# Patient Record
Sex: FEMALE | Race: WHITE | Employment: UNEMPLOYED | URBAN - METROPOLITAN AREA
[De-identification: names, ages, dates, MRNs, and addresses within clinical notes are randomized per-mention and may not be internally consistent; named-entity substitution may affect disease eponyms.]

---

## 2018-01-09 PROBLEM — R92.8 ABNORMAL MAMMOGRAM: Status: ACTIVE | Noted: 2018-01-09

## 2019-01-23 NOTE — PROGRESS NOTES
Patient presents with:  Breast Pain: left side, upper left pain, left calf pain, started 2 years ago       HPI:     This 37year old female presents with a chief complaint of chest pain. Onset -Gallo around  .     Location - Left chest   Description wall: no tenderness  Heart: S1, S2 normal, no murmur, click, rub or gallop, regular rate and rhythm  Abdomen: soft, non-tender; bowel sounds normal; no masses,  no organomegaly  Extremities: extremities normal, atraumatic, no cyanosis or edema  Pulses: 2+

## 2019-01-25 NOTE — TELEPHONE ENCOUNTER
EKG 12-LEAD   Notes recorded by EVELIO Moscoso on 1/25/2019 at 10:41 AM CST  Normal     XR CHEST PA + LAT CHEST (CPT=71046)   Notes recorded by EVELIO Moscoso on 1/25/2019 at 10:41 AM CST  Normal     LABS  Notes recorded by TK Moscoso

## 2019-09-10 PROBLEM — N39.0 FREQUENT UTI: Status: ACTIVE | Noted: 2019-09-10

## 2021-02-15 NOTE — LETTER
ASTHMA ACTION PLAN for Ben Irene     : 1975     Date: 2023  Provider:  Rakesh Prater MD  Phone for doctor or clinic: [facility] [address]  [address] [address]  [facility]     ACT Score: 25      You can use the colors of a traffic light to help learn about your asthma medicines. 1. Green - Go! % of Personal Best Peak Flow Use controller medicine. Breathing is good  No cough or wheeze  Can work and play Medicine How much to take When to take it    PULMICORT FLEXHALER Inhale 1 puff into the lungs 2 (two) times daily. 2. Yellow - Caution. 50-79% Personal Best Peak  Flow. Use reliever medicine to keep an asthma attack from getting bad. Cough  Wheezing  Tight Chest  Wake up at night Medicine How much to take When to take it    Albuterol Inhaler                   2 puffs                             Every 4 to 6 hrs as needed for                                                                                  shortness of breath and wheezing. Additional instructions Call our office in event of an increased use of rescue inhaler to make an appointment in order to prevent red zone symptoms. 3. Red - Stop! Danger!  <50% Personal Best Peak  Flow. Take these medications until  Get help from a doctor   Medicine not helping  Breathing is hard and fast  Nose opens wide  Can't walk  Ribs show  Can't talk well Medicine How much to take When to take it    Call 911, or go to the emergency room immediately! Take Albuterol every 15 minutes until you have received medical attention. Additional Instructions If your symptoms do not improve and you cannot contact your doctor, [facility] or call 911 immediately! [x] Asthma Action Plan reviewed with patient (and caregiver if necessary) and a copy of the plan was given to the patient/caregiver.    [] Asthma Action Plan reviewed with patient (and caregiver if Pt notified of results and verbalized understanding.  Lab appt made for non-fasting A1C prior to 03/16/2021 appt with Dr. Ocampo.     necessary) on the phone and mailed copy to patient or submitted via [address]      Signatures:  Provider  Kerry Hanna MD   Patient Caretaker

## 2021-04-13 PROBLEM — F50.9 EATING DISORDER, UNSPECIFIED: Status: ACTIVE | Noted: 2021-04-13

## 2021-05-25 PROBLEM — F33.1 MAJOR DEPRESSIVE DISORDER, RECURRENT EPISODE, MODERATE WITH ANXIOUS DISTRESS (HCC): Status: ACTIVE | Noted: 2021-05-25

## 2021-10-11 PROBLEM — J45.30 MILD PERSISTENT ASTHMA, UNSPECIFIED WHETHER COMPLICATED (HCC): Status: ACTIVE | Noted: 2021-10-11

## 2021-10-11 PROBLEM — M19.041 ARTHRITIS OF FINGER OF BOTH HANDS: Status: ACTIVE | Noted: 2021-10-11

## 2021-10-11 PROBLEM — E66.811 OBESITY (BMI 30.0-34.9): Status: ACTIVE | Noted: 2021-10-11

## 2021-10-11 PROBLEM — Z13.228 SCREENING FOR METABOLIC DISORDER: Status: ACTIVE | Noted: 2021-10-11

## 2021-10-11 PROBLEM — R53.83 OTHER FATIGUE: Status: ACTIVE | Noted: 2021-10-11

## 2021-10-11 PROBLEM — M72.2 PLANTAR FASCIITIS: Status: ACTIVE | Noted: 2021-10-11

## 2021-10-11 PROBLEM — Z13.1 SCREENING FOR DIABETES MELLITUS: Status: ACTIVE | Noted: 2021-10-11

## 2021-10-11 PROBLEM — M19.042 ARTHRITIS OF FINGER OF BOTH HANDS: Status: ACTIVE | Noted: 2021-10-11

## 2021-10-11 PROBLEM — Z13.220 SCREENING CHOLESTEROL LEVEL: Status: ACTIVE | Noted: 2021-10-11

## 2021-10-11 PROBLEM — J45.30 MILD PERSISTENT ASTHMA, UNSPECIFIED WHETHER COMPLICATED: Status: ACTIVE | Noted: 2021-10-11

## 2021-10-11 PROBLEM — G44.209 TENSION HEADACHE: Status: ACTIVE | Noted: 2021-10-11

## 2021-10-11 PROBLEM — E66.9 OBESITY (BMI 30.0-34.9): Status: ACTIVE | Noted: 2021-10-11

## 2021-10-13 PROBLEM — M94.0 COSTOCHONDRITIS: Status: ACTIVE | Noted: 2021-10-13

## 2021-12-23 NOTE — PROGRESS NOTES
This is a telemedicine visit with live, interactive video and audio. Amy Saint Broccoli verbally consents to a Virtual/Telephone Check-In visit on 12/23/21.     Patient understands and accepts financial responsibility for any deductible, co-insurance and/or co Disease Maternal Grandfather    • Heart Disease Maternal Grandfather       Social History    Tobacco Use      Smoking status: Never Smoker      Smokeless tobacco: Never Used    Vaping Use      Vaping Use: Never used    Alcohol use: Yes      Comment: social lesions    ASSESSMENT & PLAN      1. Chest wall pain  Most likely costochondritis  Counseled on conservative tx, warm compresses rest, OTC Tylenol or motrin as needed    2. History of COVID-19  Pt asymptomatic. Doing well.       Follow up for physial in Mar

## 2022-02-10 NOTE — TELEPHONE ENCOUNTER
From: Jolanta Sumner  To: Kristi Flores MD  Sent: 2/9/2022 6:42 PM CST  Subject: Referral    Good Evening Dr. Ranulfo Deleon,  I am in need of a referral to psychiatric services. Are [facility] or do I need to schedule a visit? My current behavioral health provider is not covered by medicaid.    Thank you,  Dominick Lesch

## 2022-11-01 PROBLEM — Z13.220 SCREENING CHOLESTEROL LEVEL: Status: RESOLVED | Noted: 2021-10-11 | Resolved: 2022-11-01

## 2022-11-01 PROBLEM — G44.209 TENSION HEADACHE: Status: RESOLVED | Noted: 2021-10-11 | Resolved: 2022-11-01

## 2022-11-01 PROBLEM — Z13.1 SCREENING FOR DIABETES MELLITUS: Status: RESOLVED | Noted: 2021-10-11 | Resolved: 2022-11-01

## 2022-11-01 PROBLEM — N39.0 FREQUENT UTI: Status: RESOLVED | Noted: 2019-09-10 | Resolved: 2022-11-01

## 2022-11-01 PROBLEM — Z13.228 SCREENING FOR METABOLIC DISORDER: Status: RESOLVED | Noted: 2021-10-11 | Resolved: 2022-11-01

## 2022-11-01 NOTE — PATIENT INSTRUCTIONS
Thank you for choosing [facility]  To Do:  FOR SHAWNA AGUILAR    Continue follow up with psychiatry  Need to establish therapy and counseling  Follow up for annual physical  Arrange for mammogram  Have blood tests done before physical  Warm compresses to chest  Arrange for Abdominal ultrasound

## 2022-11-02 NOTE — TELEPHONE ENCOUNTER
[facility]  To: Erika Scott MD  Sent: 11/2/2022 3:35 PM CDT  Subject: Prior Authorization     Good Afternoon Dr. Shellie Atwood,    I scheduled the abdominal ultrasound for November 8th, however [facility] requires prior authorization through [place] beforehand. Please let me know if you have any questions. Thank you!     Helen Moran  [phone]

## 2022-11-06 PROBLEM — J45.40 MODERATE PERSISTENT ASTHMA WITHOUT COMPLICATION: Status: ACTIVE | Noted: 2022-11-06

## 2022-11-06 PROBLEM — F32.A DEPRESSION: Status: ACTIVE | Noted: 2022-11-06

## 2022-11-06 PROBLEM — J30.9 ALLERGIC RHINITIS: Status: ACTIVE | Noted: 2022-11-06

## 2022-11-06 PROBLEM — F50.819 BINGE EATING DISORDER: Status: ACTIVE | Noted: 2021-04-13

## 2022-11-06 PROBLEM — J45.40 MODERATE PERSISTENT ASTHMA WITHOUT COMPLICATION (HCC): Status: ACTIVE | Noted: 2022-11-06

## 2022-11-06 PROBLEM — F50.81 BINGE EATING DISORDER: Status: ACTIVE | Noted: 2021-04-13

## 2022-11-16 NOTE — TELEPHONE ENCOUNTER
From: Nasir Guest  To: Jud Knott MD  Sent: 11/16/2022 10:29 AM CST  Subject: Colonoscopy     Good Morning Dr. Romain Buckley,    Dr. Marie Bryan doesn't perform colonoscopies. Can you put an order in my chart and I will get it done as soon as possible.     Thank you,    Emily

## 2022-11-18 NOTE — TELEPHONE ENCOUNTER
MyChart to inform that colonoscopies are no longer performed at the [facility].

## 2022-11-18 NOTE — TELEPHONE ENCOUNTER
From: Natali William  Sent: 11/18/2022 10:02 AM CST  To: [facility] Clinical Staff  Subject: Colonoscopy     Good Morning,    Is it possible for me to schedule the colonoscopy at the following location? [facility]    If so, do you happen to know the name of the [facility] Doctor I should contact?     Thank you,  Emily

## 2022-12-18 NOTE — TELEPHONE ENCOUNTER
----- Message from rKisti Flores MD sent at 12/15/2022  1:48 PM CST -----  Pls order bilat breast US as screening for dense breasts    Pt getting previous films per radiology

## 2023-01-17 NOTE — TELEPHONE ENCOUNTER
Initiated authorization for Laparoscopic Cholecystectomy w/ Cholangiogram CPT 21041 dx:K80.11 to be done at [facility] with Lev Guthrie at [facility]  Case #[id number]  Status: Approved-authorization is not required per health plan      Scheduled 2/27/23

## 2023-05-01 NOTE — PATIENT INSTRUCTIONS
Thank you for choosing [facility]  To Do:  FOR SHAWNA AGUILAR    Follow up as needed  Safe sex practices  Arrange for colonoscopy  Follow up for annual physical when due  Have blood tests done

## 2023-07-17 NOTE — ANESTHESIA PROCEDURE NOTES
Airway  Date/Time: 7/17/2023 1:33 PM  Urgency: elective    Airway not difficult    General Information and Staff    Patient location during procedure: OR  Anesthesiologist: Saad Jackman MD  Performed: anesthesiologist   Performed by: Saad Jackman MD  Authorized by: Saad Jackman MD      Indications and Patient Condition  Indications for airway management: anesthesia  Sedation level: deep  Preoxygenated: yes  Patient position: sniffing  Mask difficulty assessment: 1 - vent by mask    Final Airway Details  Final airway type: endotracheal airway      Successful airway: ETT  Cuffed: yes   Successful intubation technique: direct laryngoscopy  Endotracheal tube insertion site: oral  Blade: Mike  Blade size: #3  ETT size (mm): 7.5    Cormack-Lehane Classification: grade IIA - partial view of glottis  Placement verified by: capnometry   Cuff volume (mL): 9  Measured from: lips  ETT to lips (cm): 21  Number of attempts at approach: 1

## 2023-07-17 NOTE — OPERATIVE REPORT
[facility]   Operative Note    Nivia Youssef Location: OR   [facility] [id number] MRN [id number]   Admission Date 7/17/2023 Operation Date 7/17/2023   Attending Physician Siva Corral MD Operating Physician Ben Puente MD     Date of procedure:   July 17, 2023    Pre-Operative Diagnosis: Symptomatic cholelithiasis, chronic cholecystitis    Indication for Surgery: Recurrent biliary colic, symptomatic cholelithiasis    Post-Operative Diagnosis/Operative Findings: Same as above negative intraoperative cholangiogram    Procedure performed:  Laparoscopic cholecystectomy with intraoperative cholangiogram    Surgeon(s) and Role:     Anibal Hill MD - Primary    Assist:     margareth Cochran    Anesthesia: General    History of Present Illness:    39-year-old female who is here for evaluation abdominal pain. The patient states that over the past 12 months she has had intermittent right upper quadrant abdominal pain with radiation to the right subscapular region. The pain is described as a dull ache with sharp exacerbations. The patient also reports fatty food intolerance as well as abdominal bloating. She has noted early satiety as well. She denies nausea or diarrhea. Over the past several months these episodes have become increasingly frequent and are lasting longer. Family history-the patient's great aunt was also diagnosed with gallstones     Ultrasound performed November 8, 2022 reveals a large calcified gallstone measuring 1.7 cm. Small gallstones are also noted. No evidence of choledocholithiasis     Treatment options were reviewed in detail with family. At this time she does wish to proceed with laparoscopic cholecystectomy with intraoperative cholangiogram for symptomatic cholelithiasis.   Emily and her father have no further questions today and wished to proceed with surgery as discussed       Discussed with patient:  The potential risks and benefits of the procedure were discussed in detail with the patient including but not limited to bleeding, infections, seroma/hematoma formation, postoperative wound complications including development of a hernia, trocar injury, intra-abdominal organ injury, bile leakage, biloma formation, common bile duct injury, conversion to an open procedure, inability to complete the cholangiogram, possible need for a drain, possible recurrence or persistence of symptoms despite surgery, postoperative diarrhea, possible need for further treatment or intervention pending cholangiogram results. These and other potential intraoperative and perioperative risks were discussed and the patient does not have any questions and does wish to proceed with surgery today. Note:  A surgical assistant was essential to the performance and conduct of this case, especially in the performance of the cholangiogram and the careful dissection needed in and around the triangle of Calot and gallbladder hilum. Summary of Procedure: The patient was taken to the operating room and was placed on the OR table in the supine position. After the induction of general anesthesia perioperative antibiotics were given. The patient was then prepped and draped in usual sterile fashion. Using local anesthesia a abdulkadir-umbilical incision was made and the anterior abdominal fascia was elevated with a Kocker forcep. The Veress needle was introduced into the abdomen and the abdomen was insufflated to 15 mm mercury. The Veress needle was then exchanged for a 11 mm port. The laparoscope was introduced. A 5 mm epigastric port and two 5 mm lateral ports were placed under direct vision without incidence. The patient was placed into a reverse Trendelenburg position with the right side up. Initial evaluation of the right upper quadrant revealed the gallbladder to be somewhat distended. Multiple omental adhesions were noted to the gallbladder and these were lysed using electrocautery.   The gallbladder was grasped at the fundus and elevated superiorly. Solano's pouch was identified and this was retracted laterally to expose the triangle of Calot. Dissection in the cystic duct-gallbladder junction was performed to define the cystic duct for sufficient length. Dissection was kept above the line of Rouviere and a critical view was obtained. The cystic duct was then clipped once proximally on the specimen side. A ductotomy was made. A cholangiocatheter was introduced through the lateral 5 mm port. The cholangiocatheter was introduced into the cystic duct. An intraoperative cholangiogram was then performed. There was no evidence of a filling defect in the cystic or the common bile duct. The common bile duct tapers down smoothly to the duodenum and there was free flow of contrast into the duodenum. The proximal common hepatic duct and distal intrahepatic radicals were visualized  without any evidence of a filling defects or other abnormalities. Representative images were submitted to the Radiology Department for a final read. The cholangiocatheter was then removed. The cystic duct was then clipped  3 times distally. The cystic duct was then divided. The cystic artery was identified and seen  to ascend onto the gallbladder wall. This was also defined for a sufficient length and was clipped twice proximally and once distally and divided. Electrocautery was then used to dissect the gallbladder away from the liver bed. Having completed this maneuver the gallbladder was placed into a Endopouch and was withdrawn through the umbilical port. The right upper quadrant was copiously irrigated until the irrigant was clear. The clips across the cystic duct and the cystic artery were examined and found to be well approximated and in good position. There was no evidence of bleeding or bile leakage from the liver bed.   The accessory ports were removed under direct vision and there was no evidence of bleeding from the anterior abdominal wall. The abdomen was then deflated. The umbilical port was closed with 0 Vicryl. All skin incisions were closed with 4-0 Vicryl in a subcuticular manner. Steri-Strips and sterile dressings were placed. All sponge, instrument and needle counts were correct at the conclusion of the procedure. The patient did tolerate the procedure well. The patient was taken to the recovery room in stable condition. EBL:   Minimal     Pathologic specimens:  The gallbladder and its contents    Lio Ball MD

## 2023-07-18 NOTE — TELEPHONE ENCOUNTER
ransaction ID: [id number] ID: [id number] Date: 2023-07-18  SHAWNA AGUILAR Patient  Member ID  [id number]    Date of Birth  1975-08-22    Gender  NA    Transaction Type  Outpatient Authorization    Organization  [address] [place] [address]    Payer  [employer] [address] [place] [address] logo  Certificate Information  Certification Number  [id number]    Status  CERTIFIED IN TOTAL    Service Information  Service Type  1 - [address] [place] [address] of [facility]  25 - On [facility]    Service From - To Date  2023-07-17 - 2023-10-17    Diagnosis Code 1   - Calculus of gallbladder w acute cholecystitis w obstruction    Procedure Code 1 (CPT/HCPCS)  [address] Kevin Manifold CHOLECYSTECTOMY/GRAPH    Quantity  1 Units    Status  CERTIFIED IN TOTAL    Rendering Providers     Provider 1  Name  Mary Carson    NPI  [id number]    Provider Role  Provider Organization    Address  [address] [place] [address]    Provider 2  Name  Issa Dougherty  [id number]    Provider Role  Attending    Address  [place] [address] [address]

## 2023-07-21 NOTE — TELEPHONE ENCOUNTER
Spoke to Emily today  Intraoperatively there were multiple sites of endometriosis throughout the abdomen  Biopsy was taken and was positive for endometriosis  Emily reports that her mother has history of endometriosis and did have to have surgery.   Her mother joined us on the call  We discussed that Emily will follow up with her PCP and or gyne to discuss management  Emily reports that she is \"feeling great\" after surgery-better than she thought she would  She is no longer taking any narcotics at this time  Emily will follow up in the office  Emily and her mother do not have any further questions at this time    Tania Warner MD, FACS

## 2023-09-20 NOTE — TELEPHONE ENCOUNTER
Patient scheduled appointment for Friday     Endometriosis, pain where gallbladder was removed, and pain on left side of pancreas

## 2023-09-20 NOTE — TELEPHONE ENCOUNTER
Called pt to obtain additional information. Pt states when she had gallbladder out in July, the surgeon informed her that she has endometriosis. Pt c/o RUQ pain and left sided rib pain. Pt does notice pain occurs after eating a fried/fatty meal and when eating a normal diet. ER precautions provided. Pt instructed to keep appt. All questions answered and pt verbalized understanding.    LOV: 05/01/23

## 2023-09-22 NOTE — PATIENT INSTRUCTIONS
Thank you for choosing [facility]  To Do:  FOR SHAWNA SANJIV AGUILAR    Warm compresses 4-5 x a day x 15-20 min  Ok to take ibuprofen as needed  Take OTC ibuprofen 600-800 mg every 6-8 hours as needed for pain. Take ibuprofen with food and stop if stomach upset nausea or vomiting.   Also ok to take Tylenol otc  Follow up with Ob , Dr Lei Montes

## 2023-10-13 NOTE — TELEPHONE ENCOUNTER
From: Oziel Campuzano  To: Rob Guo  Sent: 10/12/2023 7:43 PM CDT  Subject: Fainted Today    Delicia Cobb, I feel like I have bronchitis. I took an over the counter COVID test and the results were negative.

## 2023-11-13 ENCOUNTER — OFFICE VISIT (OUTPATIENT)
Dept: FAMILY MEDICINE CLINIC | Facility: CLINIC | Age: 48
End: 2023-11-13
Payer: MEDICAID

## 2023-11-13 VITALS
SYSTOLIC BLOOD PRESSURE: 118 MMHG | DIASTOLIC BLOOD PRESSURE: 76 MMHG | BODY MASS INDEX: 32.74 KG/M2 | HEIGHT: 68 IN | OXYGEN SATURATION: 96 % | RESPIRATION RATE: 18 BRPM | WEIGHT: 216 LBS | HEART RATE: 116 BPM

## 2023-11-13 DIAGNOSIS — Z12.31 ENCOUNTER FOR SCREENING MAMMOGRAM FOR BREAST CANCER: ICD-10-CM

## 2023-11-13 DIAGNOSIS — J30.9 ALLERGIC RHINITIS, UNSPECIFIED SEASONALITY, UNSPECIFIED TRIGGER: ICD-10-CM

## 2023-11-13 DIAGNOSIS — J45.40 MODERATE PERSISTENT ASTHMA WITHOUT COMPLICATION: ICD-10-CM

## 2023-11-13 DIAGNOSIS — Z12.11 COLON CANCER SCREENING: ICD-10-CM

## 2023-11-13 DIAGNOSIS — Z00.00 ENCOUNTER FOR ANNUAL PHYSICAL EXAMINATION EXCLUDING GYNECOLOGICAL EXAMINATION IN A PATIENT OLDER THAN 17 YEARS: Primary | ICD-10-CM

## 2023-11-13 PROCEDURE — 99396 PREV VISIT EST AGE 40-64: CPT | Performed by: EMERGENCY MEDICINE

## 2023-11-13 PROCEDURE — 3008F BODY MASS INDEX DOCD: CPT | Performed by: EMERGENCY MEDICINE

## 2023-11-13 PROCEDURE — 3078F DIAST BP <80 MM HG: CPT | Performed by: EMERGENCY MEDICINE

## 2023-11-13 PROCEDURE — 3074F SYST BP LT 130 MM HG: CPT | Performed by: EMERGENCY MEDICINE

## 2023-11-13 RX ORDER — BUDESONIDE 180 UG/1
1 AEROSOL, POWDER RESPIRATORY (INHALATION) 2 TIMES DAILY
Qty: 1 EACH | Refills: 12 | Status: SHIPPED | OUTPATIENT
Start: 2023-11-13

## 2023-11-13 RX ORDER — FLUTICASONE PROPIONATE 50 MCG
2 SPRAY, SUSPENSION (ML) NASAL DAILY
Qty: 16 G | Refills: 11 | Status: SHIPPED | OUTPATIENT
Start: 2023-11-13 | End: 2023-12-13

## 2023-11-13 RX ORDER — ALBUTEROL SULFATE 90 UG/1
1-2 AEROSOL, METERED RESPIRATORY (INHALATION) EVERY 4 HOURS PRN
Qty: 1 EACH | Refills: 3 | Status: SHIPPED | OUTPATIENT
Start: 2023-11-13

## 2023-11-14 ENCOUNTER — OFFICE VISIT (OUTPATIENT)
Facility: LOCATION | Age: 48
End: 2023-11-14
Payer: MEDICAID

## 2023-11-14 VITALS — HEART RATE: 103 BPM | TEMPERATURE: 98 F

## 2023-11-14 DIAGNOSIS — Z12.11 ENCOUNTER FOR SCREENING COLONOSCOPY: Primary | ICD-10-CM

## 2023-11-14 PROCEDURE — S0285 CNSLT BEFORE SCREEN COLONOSC: HCPCS | Performed by: STUDENT IN AN ORGANIZED HEALTH CARE EDUCATION/TRAINING PROGRAM

## 2023-11-14 RX ORDER — POLYETHYLENE GLYCOL 3350, SODIUM CHLORIDE, SODIUM BICARBONATE, POTASSIUM CHLORIDE 420; 11.2; 5.72; 1.48 G/4L; G/4L; G/4L; G/4L
POWDER, FOR SOLUTION ORAL
Qty: 1 EACH | Refills: 0 | Status: SHIPPED | OUTPATIENT
Start: 2023-11-14

## 2023-11-15 DIAGNOSIS — J45.40 MODERATE PERSISTENT ASTHMA WITHOUT COMPLICATION: ICD-10-CM

## 2023-11-15 DIAGNOSIS — J30.9 ALLERGIC RHINITIS, UNSPECIFIED SEASONALITY, UNSPECIFIED TRIGGER: ICD-10-CM

## 2023-11-15 RX ORDER — ALBUTEROL SULFATE 90 UG/1
1-2 AEROSOL, METERED RESPIRATORY (INHALATION) EVERY 4 HOURS PRN
Qty: 1 EACH | Refills: 3 | OUTPATIENT
Start: 2023-11-15

## 2023-11-15 RX ORDER — BUDESONIDE 180 UG/1
1 AEROSOL, POWDER RESPIRATORY (INHALATION) 2 TIMES DAILY
Qty: 1 EACH | Refills: 12 | OUTPATIENT
Start: 2023-11-15

## 2023-11-15 RX ORDER — FLUTICASONE PROPIONATE 50 MCG
2 SPRAY, SUSPENSION (ML) NASAL DAILY
Qty: 16 G | Refills: 11 | OUTPATIENT
Start: 2023-11-15 | End: 2023-12-15

## 2023-12-05 ENCOUNTER — PATIENT MESSAGE (OUTPATIENT)
Dept: FAMILY MEDICINE CLINIC | Facility: CLINIC | Age: 48
End: 2023-12-05

## 2023-12-06 NOTE — TELEPHONE ENCOUNTER
From: Osmar Joel  To: Jodie Flannery  Sent: 12/5/2023 2:29 PM CST  Subject: Prior Jeneane Rings ,    Marium Watson sent over Prior Authorizations to you today for 2 prescriptions:    1. Pulmicort  2. Proventil    Can you send the information to [facility] for approval? I'm still having issues getting [facility] to fill these prescriptions. Thank you!    Naomi Crowe  [phone]     Refills have been requested for the following medications:      albuterol 108 (90 Base) MCG/ACT Inhalation Aero Soln [Phamela Santino]      Budesonide (PULMICORT FLEXHALER) 180 MCG/ACT Inhalation Aerosol Powder, Breath Activated [Phamela Santino]      fluticasone propionate 50 MCG/ACT Nasal Suspension [Phamela Santino]     Preferred pharmacy: [id number] - [address] [facility], [address] AT [facility] [address] [place] [address] , [phone], [phone]

## 2023-12-08 NOTE — TELEPHONE ENCOUNTER
[address]. Albuterol is not covered; however, Proair is covered. Will change to Proair- same directions. Pulmicort is not covered; however, insurance did not provide alternatives.

## 2023-12-11 RX ORDER — FLUTICASONE PROPIONATE 110 UG/1
2 AEROSOL, METERED RESPIRATORY (INHALATION) 2 TIMES DAILY
Qty: 3 EACH | Refills: 3 | Status: SHIPPED | OUTPATIENT
Start: 2023-12-11

## 2023-12-18 ENCOUNTER — HOSPITAL ENCOUNTER (OUTPATIENT)
Dept: MAMMOGRAPHY | Age: 48
Discharge: HOME | End: 2023-12-18
Attending: EMERGENCY MEDICINE
Payer: MEDICAID

## 2023-12-18 DIAGNOSIS — Z12.31 ENCOUNTER FOR SCREENING MAMMOGRAM FOR BREAST CANCER: ICD-10-CM

## 2023-12-18 PROCEDURE — 77067 SCR MAMMO BI INCL CAD: CPT | Performed by: EMERGENCY MEDICINE

## 2023-12-18 PROCEDURE — 77063 BREAST TOMOSYNTHESIS BI: CPT | Performed by: EMERGENCY MEDICINE

## 2024-01-16 ENCOUNTER — HOSPITAL ENCOUNTER (OUTPATIENT)
Dept: MAMMOGRAPHY | Age: 49
Discharge: HOME | End: 2024-01-16
Attending: EMERGENCY MEDICINE
Payer: MEDICAID

## 2024-01-16 ENCOUNTER — HOSPITAL ENCOUNTER (OUTPATIENT)
Dept: ULTRASOUND IMAGING | Age: 49
Discharge: HOME | End: 2024-01-16
Attending: EMERGENCY MEDICINE
Payer: MEDICAID

## 2024-01-16 DIAGNOSIS — R92.2 INCONCLUSIVE MAMMOGRAM: ICD-10-CM

## 2024-01-16 PROCEDURE — 76642 ULTRASOUND BREAST LIMITED: CPT | Performed by: EMERGENCY MEDICINE

## 2024-01-16 PROCEDURE — 77065 DX MAMMO INCL CAD UNI: CPT | Performed by: EMERGENCY MEDICINE

## 2024-01-16 PROCEDURE — 77061 BREAST TOMOSYNTHESIS UNI: CPT | Performed by: EMERGENCY MEDICINE

## 2024-01-24 DIAGNOSIS — R92.30 DENSE BREAST: Primary | ICD-10-CM

## 2024-01-24 DIAGNOSIS — R92.8 ABNORMAL MAMMOGRAM: ICD-10-CM

## 2024-02-06 ENCOUNTER — TELEPHONE (OUTPATIENT)
Dept: FAMILY MEDICINE CLINIC | Facility: CLINIC | Age: 49
End: 2024-02-06

## 2024-02-09 ENCOUNTER — OFFICE VISIT (OUTPATIENT)
Dept: FAMILY MEDICINE CLINIC | Facility: CLINIC | Age: 49
End: 2024-02-09
Payer: MEDICAID

## 2024-02-09 VITALS
DIASTOLIC BLOOD PRESSURE: 80 MMHG | WEIGHT: 217 LBS | SYSTOLIC BLOOD PRESSURE: 134 MMHG | RESPIRATION RATE: 20 BRPM | HEART RATE: 102 BPM | BODY MASS INDEX: 32.89 KG/M2 | HEIGHT: 68 IN | OXYGEN SATURATION: 98 %

## 2024-02-09 DIAGNOSIS — H10.13 ALLERGIC CONJUNCTIVITIS OF BOTH EYES: Primary | ICD-10-CM

## 2024-02-09 DIAGNOSIS — N92.6 MISSED PERIOD: ICD-10-CM

## 2024-02-09 LAB
CONTROL LINE PRESENT WITH A CLEAR BACKGROUND (YES/NO): YES YES/NO
KIT EXPIRATION DATE: NORMAL DATE
KIT LOT #: NORMAL NUMERIC
PREGNANCY TEST, URINE: NEGATIVE

## 2024-02-09 RX ORDER — OLOPATADINE HYDROCHLORIDE 1 MG/ML
1 SOLUTION/ DROPS OPHTHALMIC 2 TIMES DAILY
Qty: 5 ML | Refills: 1 | Status: SHIPPED | OUTPATIENT
Start: 2024-02-09

## 2024-02-09 NOTE — PATIENT INSTRUCTIONS
Thank you for choosing [facility]  To Do:  FOR SHAWNA WILKINSON    Cool compresses to eyes  Use pataday eyedrops  Ok to try OTC Zyrtec D  Use flonase twice a day  Call if with any worsening or persistent Sx

## 2024-02-09 NOTE — PROGRESS NOTES
Chief Complaint:   Chief Complaint   Patient presents with    Eye Problem     Pt c/o bilateral itchy eyes, swelling, and constant tearing X3 days. Pt states she is using antihistamines and that seems to help. Pt also feels a lot of pressure in both eyes.     Nasal Congestion    Missed Appt     Pt states she missed her period last month. She took a few at-home pregnancy tests and they were all negative.      HPI:   This is a 48 year old female       EYE IRRITAION    4 days ago was cleaning out her grandmoms house.  Lots of  dust and mold.  Felt eye irritaion the same evning  Now with eye irration and swelling in eye lids  + excessive tearing  No light sensitivity    + mild runny nose  Benadryl helped a little  No rash   No cough  + history of asthma    Patient worried about numbness..  Did not have her menses 1 month ago.  She is usually regular and this is the first missed.  In the long time.  Did several pregnancy tests at home which were negative.          PMSH       Past Medical History:   Diagnosis Date    Allergic rhinitis     Anxiety     Arthritis     Costochondritis    ASTHMA     Asthma     Depression     Disorder of liver     fatty liver    Extrinsic asthma, unspecified     Fibrocystic breast changes     lumps removed    H/O breast biopsy 10/15/2015    HEADACHES     stress or allergy related    PONV (postoperative nausea and vomiting)     SEASONAL ALLERGIES      Past Surgical History:   Procedure Laterality Date    BENIGN BIOPSY RIGHT  2000    CHOLECYSTECTOMY      CYST ASPIRATION LEFT      AUTUMN LOCALIZATION WIRE 1 SITE RIGHT (CPT=19281)  1996    benign    OTHER SURGICAL HISTORY      Breast Lump removal. Right Breast. 2002    OTHER SURGICAL HISTORY Left     srini in ear, s/p stapendectomy    TONSILLECTOMY      1993     Social History:  Social History     Socioeconomic History    Marital status: Single   Tobacco Use    Smoking status: Never    Smokeless tobacco: Never   Vaping Use    Vaping Use: Never used    Substance and Sexual Activity    Alcohol use: Not Currently     Comment: socially    Drug use: No    Sexual activity: Yes     Partners: Male     Birth control/protection: Vasectomy   Other Topics Concern    Caffeine Concern No    Exercise No    Seat Belt No    Special Diet No    Stress Concern No    Weight Concern No   Social History Narrative    ** Merged History Encounter **          Family History:  Family History   Problem Relation Age of Onset    Hypertension Maternal Grandmother     Thyroid Disorder Maternal Grandmother     High Cholesterol Maternal Grandmother     Breast Cancer Maternal Grandmother 75        75    Cancer Maternal Grandmother         Skin    Hypertension Maternal Grandfather     High Cholesterol Maternal Grandfather     Hypertension Paternal Grandmother     High Cholesterol Paternal Grandmother     Heart Disease Paternal Grandmother     Hypertension Paternal Grandfather     High Cholesterol Paternal Grandfather     Heart Disease Paternal Grandfather     Thyroid Disorder Other     Hypertension Father     Hypertension Mother     Cancer Mother         Skin    Heart Disease Maternal Grandfather     Heart Disease Maternal Grandfather      Allergies:  Allergies   Allergen Reactions    Eggs Or Egg-Derived Products HIVES     Very bad allergic reaction with raw eggs, ie hannon dought  More cooked, less reaction      Latex ITCHING and SWELLING    Tree Nuts ANAPHYLAXIS     Including peanuts, legume family    Codeine [Opioid Analgesics] NAUSEA AND VOMITING    Latex ITCHING and SWELLING    Ascorbate ITCHING    Dust Mite Extract ITCHING     animals    Shellfish-Derived Products DIARRHEA    Sulfa Antibiotics ITCHING    Wheat Bran ITCHING     Current Meds:  Current Outpatient Medications on File Prior to Visit   Medication Sig Dispense Refill    fluticasone propionate 110 MCG/ACT Inhalation Aerosol Inhale 2 puffs into the lungs 2 (two) times daily. 3 each 3    PEG 3350-KCl-Na Bicarb-NaCl (TRILYTE) 420 g Oral  Recon Soln Starting at 4:00 pm the night before procedure, drink 8 ounces of the prep every 15-20 minutes until finished 1 each 0    albuterol 108 (90 Base) MCG/ACT Inhalation Aero Soln Inhale 1-2 puffs into the lungs every 4 (four) hours as needed for Wheezing. 1 each 3    traZODone 50 MG Oral Tab Take 1 tablet (50 mg total) by mouth nightly as needed. AT BEDTIME      escitalopram (LEXAPRO) 20 MG Oral Tab Take 1 tablet (20 mg total) by mouth daily. 90 tablet 1    DiphenhydrAMINE HCl (BENADRYL OR) Take by mouth as needed.      Cholecalciferol (VITAMIN D-3) 5000 units Oral Tab Take 1 tablet (5,000 Units total) by mouth daily as needed.      CALCIUM-MAGNESIUM OR Take 1 tablet by mouth daily as needed.      Multiple Vitamin (MULTI-VITAMIN) Oral Tab Take 1 tablet by mouth daily as needed.      EPINEPHrine (EPIPEN 2-WILLIAM IJ) Inject as directed as needed.       No current facility-administered medications on file prior to visit.      Counseling given: Not Answered         PROBLEM LIST     Patient Active Problem List   Diagnosis    H/O breast biopsy    H/O diplopia    Myopia of both eyes    Abnormal mammogram    Binge eating disorder    Major depressive disorder, recurrent episode, moderate with anxious distress (HCC)    Mild persistent asthma, unspecified whether complicated    Arthritis of finger of both hands    Plantar fasciitis    Obesity (BMI 30.0-34.9)    Costochondritis    Moderate persistent asthma without complication    Allergic rhinitis    Depression         REVIEW OF SYSTEMS:   Review of systems significant for eye irritation  The rest of the review of systems is negative except those stated as above    PHYSICAL EXAM:   /80   Pulse 102   Resp 20   Ht 5' 8\" (1.727 m)   Wt 217 lb (98.4 kg)   LMP 12/21/2023 (Exact Date)   SpO2 98%   BMI 32.99 kg/m²  Estimated body mass index is 32.99 kg/m² as calculated from the following:    Height as of this encounter: 5' 8\" (1.727 m).    Weight as of this  encounter: 217 lb (98.4 kg).   Vital signs reviewed.Appears stated age, well groomed.  GENERAL: well developed, well nourished, well hydrated, no distress  SKIN: good skin turgor, no obvious rashes  HEENT: atraumatic, normocephalic, ears, nose and throat are clear  EYES: sclera non icteric bilateral mild injection of the bulbar conjunctive a.  Corneas are clear on gross exam.  Is equal ocular movements no evidence for hyphema no evidence for foreign body on gross exam.  Scanty clear discharge bilaterally.      ASSESSMENT AND PLAN:         1. Allergic conjunctivitis of both eyes    2. Missed period  - Urine Pregnancy Test  Okay to monitor menses.  Most likely secondary to perimenopause.    PATIENT INSTRUCTIONS:    Cool compresses to eyes  Use pataday eyedrops  Ok to try OTC Zyrtec D  Use flonase twice a day  Call if with any worsening or persistent Sx

## 2024-02-21 ENCOUNTER — PATIENT MESSAGE (OUTPATIENT)
Dept: FAMILY MEDICINE CLINIC | Facility: CLINIC | Age: 49
End: 2024-02-21

## 2024-02-21 NOTE — TELEPHONE ENCOUNTER
From: Emily Sood  To: Shad De  Sent: 2/21/2024 2:31 PM CST  Subject: Blurry Vision in Right Eye    Helezio De,  I went to see an optometrist and discovered I have a problem with the macula. I have a spot in the macula that caused part of my field of vision to become blurry. When I look at a door or something with straight lines, they look curvy. Also I feel like someone took a photo of me with the flash on because I am seeing that effect in the middle of my eye.     The distorted vision is causing me to feel nauseated today. Do I need to come in for any medical testing or can you refer me to an ophthalmologist that accepts Medicaid?    The optometrist recommended I go to [facility] but the earliest appointment is Monday.    Please advise.    Thank you!   Emily  [phone]

## 2024-02-27 NOTE — TELEPHONE ENCOUNTER
Patient states to disregard below message. Patient seen at [facility]. Saw ophthalmologist on Monday, 2/26/2024. Scheduled to see retina specialist on 2/29/2024.

## 2024-02-27 NOTE — TELEPHONE ENCOUNTER
Called [facility] ([phone]). Spoke with Lisbet. [facility] is not accepting any more new patients with medicaid replacement at this time.

## 2024-02-27 NOTE — TELEPHONE ENCOUNTER
Pt may try ophthalmologists at [facility],   Suggest that patient call her  and ask for ophthalmologists that take Medicaid

## 2024-03-01 ENCOUNTER — APPOINTMENT (OUTPATIENT)
Dept: ADMINISTRATIVE | Facility: HOSPITAL | Age: 49
End: 2024-03-01
Payer: MEDICAID

## 2024-03-01 RX ORDER — FLUTICASONE PROPIONATE 50 MCG
2 SPRAY, SUSPENSION (ML) NASAL DAILY
COMMUNITY

## 2024-03-01 RX ORDER — DIFLUPREDNATE OPHTHALMIC 0.5 MG/ML
EMULSION OPHTHALMIC
COMMUNITY

## 2024-03-01 RX ORDER — CETIRIZINE HYDROCHLORIDE 10 MG/1
10 TABLET ORAL NIGHTLY
COMMUNITY

## 2024-03-14 ENCOUNTER — TELEPHONE (OUTPATIENT)
Facility: LOCATION | Age: 49
End: 2024-03-14

## 2024-03-14 NOTE — TELEPHONE ENCOUNTER
SHAWNA WILKINSON Patient  Member ID  [id number]    Date of Birth  1975-08-22    Gender  Female    Transaction Type  Outpatient Authorization    Organization  [facility]    Payer  [facility] logo     Certificate Information  Reference Number  [id number]    Status  NO ACTION REQUIRED    Message  Requested Service does not require preauthorization. We would strongly encourage you to check benefits for this service.    Member Information  Patient Name  SHAWNA WILKINSON    Patient Date of Birth  1975-08-22    Patient Gender  Female    Member ID  [id number]    Relationship to Subscriber  Self    Subscriber Name  SHAWNA WILKINSON    Requesting Provider     Name  JENSSUMITDEEJAY WILLISIGLESIA    NPI  [id number]    Tax Id  [id number]    Specialty  [id number]  Provider Role  Provider    Address  [address] [place] [address]    Phone  [phone]  Fax  [phone]    Contact Name  LINK LEACH    Service Information  Service Type  2 - Surgical    Place of Service  22 - On [facility]    Service From - To Date  2024-03-28 - 2024-06-26    Level of Service  Elective    Diagnosis Code 1   - Encounter for screening for malignant neoplasm of colon    Procedure Code 1 (CPT/HCPCS)  56269 - DIAGNOSTIC COLONOSCOPY    Quantity  1 Units    Status  NO ACTION REQUIRED    Rendering Provider/Facility     Provider 1  Name  BETH MEHTA    NPI  [id number]    Specialty  [id number]  Provider Role  Attending    Address  [address] [place] [address]    Fax  [phone]    Provider 2  Name  [facility]    NPI  [id number]    Provider Role  Facility    Address  [address] [place] [address]

## 2024-03-28 ENCOUNTER — HOSPITAL ENCOUNTER (OUTPATIENT)
Facility: HOSPITAL | Age: 49
Setting detail: HOSPITAL OUTPATIENT SURGERY
Discharge: HOME | End: 2024-03-28
Attending: STUDENT IN AN ORGANIZED HEALTH CARE EDUCATION/TRAINING PROGRAM | Admitting: STUDENT IN AN ORGANIZED HEALTH CARE EDUCATION/TRAINING PROGRAM
Payer: MEDICAID

## 2024-03-28 ENCOUNTER — ANESTHESIA EVENT (OUTPATIENT)
Dept: ENDOSCOPY | Facility: HOSPITAL | Age: 49
End: 2024-03-28
Payer: MEDICAID

## 2024-03-28 ENCOUNTER — ANESTHESIA (OUTPATIENT)
Dept: ENDOSCOPY | Facility: HOSPITAL | Age: 49
End: 2024-03-28
Payer: MEDICAID

## 2024-03-28 VITALS
RESPIRATION RATE: 16 BRPM | BODY MASS INDEX: 32.74 KG/M2 | HEART RATE: 75 BPM | SYSTOLIC BLOOD PRESSURE: 117 MMHG | TEMPERATURE: 97 F | WEIGHT: 216 LBS | OXYGEN SATURATION: 100 % | DIASTOLIC BLOOD PRESSURE: 73 MMHG | HEIGHT: 68 IN

## 2024-03-28 DIAGNOSIS — Z12.11 ENCOUNTER FOR SCREENING COLONOSCOPY: ICD-10-CM

## 2024-03-28 LAB — B-HCG UR QL: NEGATIVE

## 2024-03-28 PROCEDURE — 81025 URINE PREGNANCY TEST: CPT

## 2024-03-28 PROCEDURE — 88305 TISSUE EXAM BY PATHOLOGIST: CPT | Performed by: STUDENT IN AN ORGANIZED HEALTH CARE EDUCATION/TRAINING PROGRAM

## 2024-03-28 PROCEDURE — 0DBL8ZX EXCISION OF TRANSVERSE COLON, VIA NATURAL OR ARTIFICIAL OPENING ENDOSCOPIC, DIAGNOSTIC: ICD-10-PCS | Performed by: STUDENT IN AN ORGANIZED HEALTH CARE EDUCATION/TRAINING PROGRAM

## 2024-03-28 PROCEDURE — 0DBH8ZX EXCISION OF CECUM, VIA NATURAL OR ARTIFICIAL OPENING ENDOSCOPIC, DIAGNOSTIC: ICD-10-PCS | Performed by: STUDENT IN AN ORGANIZED HEALTH CARE EDUCATION/TRAINING PROGRAM

## 2024-03-28 RX ORDER — SODIUM CHLORIDE, SODIUM LACTATE, POTASSIUM CHLORIDE, CALCIUM CHLORIDE 600; 310; 30; 20 MG/100ML; MG/100ML; MG/100ML; MG/100ML
INJECTION, SOLUTION INTRAVENOUS CONTINUOUS
Status: DISCONTINUED | OUTPATIENT
Start: 2024-03-28 | End: 2024-03-28

## 2024-03-28 RX ORDER — ONDANSETRON 2 MG/ML
4 INJECTION INTRAMUSCULAR; INTRAVENOUS EVERY 6 HOURS PRN
Status: DISCONTINUED | OUTPATIENT
Start: 2024-03-28 | End: 2024-03-28

## 2024-03-28 RX ORDER — NALOXONE HYDROCHLORIDE 0.4 MG/ML
0.08 INJECTION, SOLUTION INTRAMUSCULAR; INTRAVENOUS; SUBCUTANEOUS AS NEEDED
Status: DISCONTINUED | OUTPATIENT
Start: 2024-03-28 | End: 2024-03-28

## 2024-03-28 RX ORDER — HYDROMORPHONE HYDROCHLORIDE 1 MG/ML
0.6 INJECTION, SOLUTION INTRAMUSCULAR; INTRAVENOUS; SUBCUTANEOUS EVERY 5 MIN PRN
Status: DISCONTINUED | OUTPATIENT
Start: 2024-03-28 | End: 2024-03-28

## 2024-03-28 RX ORDER — PROCHLORPERAZINE EDISYLATE 5 MG/ML
5 INJECTION INTRAMUSCULAR; INTRAVENOUS EVERY 8 HOURS PRN
Status: DISCONTINUED | OUTPATIENT
Start: 2024-03-28 | End: 2024-03-28

## 2024-03-28 RX ORDER — LIDOCAINE HYDROCHLORIDE 10 MG/ML
INJECTION, SOLUTION EPIDURAL; INFILTRATION; INTRACAUDAL; PERINEURAL AS NEEDED
Status: DISCONTINUED | OUTPATIENT
Start: 2024-03-28 | End: 2024-03-28 | Stop reason: SURG

## 2024-03-28 RX ORDER — HYDROMORPHONE HYDROCHLORIDE 1 MG/ML
0.2 INJECTION, SOLUTION INTRAMUSCULAR; INTRAVENOUS; SUBCUTANEOUS EVERY 5 MIN PRN
Status: DISCONTINUED | OUTPATIENT
Start: 2024-03-28 | End: 2024-03-28

## 2024-03-28 RX ORDER — HYDROMORPHONE HYDROCHLORIDE 1 MG/ML
0.4 INJECTION, SOLUTION INTRAMUSCULAR; INTRAVENOUS; SUBCUTANEOUS EVERY 5 MIN PRN
Status: DISCONTINUED | OUTPATIENT
Start: 2024-03-28 | End: 2024-03-28

## 2024-03-28 RX ADMIN — SODIUM CHLORIDE, SODIUM LACTATE, POTASSIUM CHLORIDE, CALCIUM CHLORIDE: 600; 310; 30; 20 INJECTION, SOLUTION INTRAVENOUS at 09:18:00

## 2024-03-28 RX ADMIN — SODIUM CHLORIDE, SODIUM LACTATE, POTASSIUM CHLORIDE, CALCIUM CHLORIDE: 600; 310; 30; 20 INJECTION, SOLUTION INTRAVENOUS at 09:58:00

## 2024-03-28 RX ADMIN — LIDOCAINE HYDROCHLORIDE 25 MG: 10 INJECTION, SOLUTION EPIDURAL; INFILTRATION; INTRACAUDAL; PERINEURAL at 09:21:00

## 2024-03-28 NOTE — H&P
New Patient Visit Note     Active Problems      1. Encounter for screening colonoscopy          Chief Complaint        Chief Complaint   Patient presents with    New Patient       NP - Colonoscopy Consult, Ref by Dr.Phamela De, No family hx, No symptoms.         PCP  Shad De MD      History of Present Illness   Emily Sood is a 48 year old female who presents for evaluation for colonoscopy.     The patient has not had previous colonoscopy.      The patient's family history is negative .     The patient denies nausea, vomiting, abdominal pain or cramping or bloating, , constipation, bright red blood in the stool, dark tarry stools, other recent changes in bowel habits, or recent weight loss. She does report occasional diarrhea. This has been more pronounced after her cholecystectomy in July. She reports 1-3 bowel movements a day. She reports diarrhea is worse after ingesting high fat content diet. She reports the symptoms are not bothersome to her at this time.      The patient's past medical history        Past Medical History:   Diagnosis Date    Anxiety      ASTHMA      Asthma      Depression      Disorder of liver       fatty liver    Extrinsic asthma, unspecified      Fibrocystic breast changes       lumps removed    H/O breast biopsy 10/15/2015    HEADACHES       stress or allergy related    PONV (postoperative nausea and vomiting)      SEASONAL ALLERGIES             The patient's past surgical history         Past Surgical History:   Procedure Laterality Date    BENIGN BIOPSY RIGHT   2000    CHOLECYSTECTOMY        CYST ASPIRATION LEFT        AUTUMN LOCALIZATION WIRE 1 SITE RIGHT (CPT=19281)   1996     benign    OTHER SURGICAL HISTORY         Breast Lump removal. Right Breast. 2002    OTHER SURGICAL HISTORY Left       srini in ear, s/p stapendectomy    TONSILLECTOMY         1993            The patient takes no blood thinners..        Past Medical / Surgical / Social / Family History    The past  medical history, past surgical history, family history, social history, allergies, and medications have been reviewed by me today.          Current Outpatient Medications on File Prior to Visit   Medication Sig    albuterol 108 (90 Base) MCG/ACT Inhalation Aero Soln Inhale 1-2 puffs into the lungs every 4 (four) hours as needed for Wheezing.    Budesonide (PULMICORT FLEXHALER) 180 MCG/ACT Inhalation Aerosol Powder, Breath Activated Inhale 1 puff into the lungs 2 (two) times daily.    fluticasone propionate 50 MCG/ACT Nasal Suspension 2 sprays by Nasal route daily.    traZODone 50 MG Oral Tab Take 1 tablet (50 mg total) by mouth nightly as needed. AT BEDTIME    [DISCONTINUED] albuterol 108 (90 Base) MCG/ACT Inhalation Aero Soln Inhale 1-2 puffs into the lungs every 4 (four) hours as needed for Wheezing.    [DISCONTINUED] Budesonide (PULMICORT FLEXHALER) 180 MCG/ACT Inhalation Aerosol Powder, Breath Activated Inhale 1 puff into the lungs in the morning and 1 puff before bedtime.    escitalopram (LEXAPRO) 20 MG Oral Tab Take 1 tablet (20 mg total) by mouth daily.    DiphenhydrAMINE HCl (BENADRYL OR) Take by mouth as needed.    Cholecalciferol (VITAMIN D-3) 5000 units Oral Tab Take 1 tablet (5,000 Units total) by mouth daily as needed.    CALCIUM-MAGNESIUM OR Take 1 tablet by mouth daily as needed.    Multiple Vitamin (MULTI-VITAMIN) Oral Tab Take 1 tablet by mouth daily as needed.    EPINEPHrine (EPIPEN 2-WILLIAM IJ) Inject as directed as needed.      No current facility-administered medications on file prior to visit.         Review of Systems  Constitutional: No Fever, No Chills, No Diaphoresis, No fatigue, No weight loss, No anorexia  Eyes: No burry vision, No icterus  ENT: No tinnitus, No rhinorrhea, No dysphagia, No odynophagia  Respiratory: No dyspnea, No wheezing  Cardiovascular: No chest pain, No palpitations, No leg swelling  Gastrointestinal: No abdominal pain, No abdominal distention, No bloating, No nausea, No  vomiting, No diarrhea, No constipation, No melena, No hematochezia  Endocrine: No polydipsia, No polyuria  Genitoruinary: No dysuria, No Hematuria  Musculoskeletal: No joint pain, No muscle pain  Neurologic: No dizzyness, No syncope, No headaches, No numbness  Hematologic: No easy bruising, No easy bleeding  Psychiatric: No anxiety, No depression        Physical Findings      Constitutional: Well nourished, well kempt  Eyes: EOMI, no scleral icterus  ENT: Nares moist, oropharynx clear  Neck: Supple, no tracheal deviation   Cardiac: Normal rate, no JVD  Respiratory: No respiratory distress, No audible wheezing  Abdominal: Soft, Nontender, Nondistended  Muskuloskeletal: Normal gait, Full ROM in all extremities  Lymphatic: no cervical or supraclavicular adenopathy  Skin: No rashes,  No lesions  Neurologic: No focal deficits  Psych: Appropriate mood and affect, oriented x3           Assessment   1. Encounter for screening colonoscopy             Plan   The patient is 48 year old and has not had a colonoscopy. The patient's family history is negative. The patient does have gastrointestinal symptoms mainly diarrhea. She reports this has accentuated in July after cholecystectomy.       Recommend proceeding with colonoscopy. I also discussed with her strategies to decrease diarrhea like limiting high fat content food and increasing fiber in her diet. She is going to trial that. If she continues to have diarrhea then I plan to prescribe a 4 week course of cholestyramine to aid with symptoms.     The benefits of colonoscopy, including detection of cancer and polyp removal prior to progression to cancer were discussed. The details of the procedure which include navigation of the colonoscope through the anus, rectum, and colon to evaluate the mucosa and removal of any polyps encountered were discussed as well. The risks of colonoscopy were discussed with the patient and include but are not limited to post-procedure bleeding,  infection, colorectal perforation, splenic injury, missed polyps, need for additional surgeries or interventions. All questions were answered and the patient voiced understanding and agreed to proceed with colonoscopy.        Here for  procedure today. No changes since last office visit.         Elissa Kendall MD

## 2024-03-28 NOTE — OPERATIVE REPORT
[facility]  Operative Note    Emily Sood Location: OR   CSN [id number] MRN [id number]    1975 Age 48 year old   Admission Date 3/28/2024 Operation Date 3/28/2024   Attending Physician Elissa Kendall MD Operating Physician Elissa Kendall MD   PCP Shad De MD          Patient Name: Emily Sood    Preoperative Diagnosis: Encounter for screening colonoscopy [Z12.11]    Postoperative Diagnosis: Same as pre-op diagnosis.    Primary Surgeon: Elissa Kendall MD    Anesthesia: MAC    Anesthesiologist: Anesthesiologist.: Francisco La MD    Procedures: Colonoscopy to the cecum     Specimen:     Estimated Blood Loss: 3 mL     Complications: None immediate    Condition: Good    Indications for Surgery:   Emily Sood is a 48 year old female who presents for screening colonoscopy. The benefits of colonoscopy, including detection of cancer and polyp removal prior to progression to cancer were discussed. The details of the procedure which include navigation of the colonoscope through the anus, rectum, and colon to evaluate the mucosa and removal of any polyps encountered were discussed as well. The risks of colonoscopy were discussed with the patient and include but are not limited to post-procedure bleeding, infection, colorectal perforation, splenic injury, missed polyps, need for additional surgeries or interventions. All questions were answered and the patient voiced understanding and agreed to proceed with colonoscopy.      Surgical Findings:   The quality of the bowel prep was good. Right colon 2, Transverse 3, Left colon 3    Description of Procedure:   The Patient was taken to the endoscopy suite and positioned in the left lateral decubitus position with knees flexed. Monitored anesthesia care was administered. A time-out was performed.    The perineum and perianal skin were examined. A digital rectal examination was performed. A rectocele was noted. No masses, otherwise normal exam. A  well-lubricated adult colonoscope was then inserted and carefully navigated to the cecum. Advancement was accomplished with minimal difficulty. The appendiceal orifice and ileocecal valve were identified and photographed. The terminal ileum was not intubated. The scope was then withdrawn as the mucosa was circumferentially examined. A 3 mm pedunculated polyp was noted in the cecum about 2 cms from the appendiceal orifice. The polyp was removed with cold forceps. A second pedunculated 3 mm polyp was noted in the hepatic flexure and was removed with cold forceps. The polypectomy sites were hemostatic.  In the rectum the scope was retroflexed to evaluate the anorectal junction.  The scope was straightened and excess gas was suctioned from the colon and the scope removed, terminating the procedure.    Anesthesia was terminated and the patient transported to the recovery unit in good condition. The patient tolerated the procedure well without apparent intraoperative complication.    Follow up:   Patient will need next colonoscopy 5 years with a 2 day split prep due to suboptimal prep today.       Elissa Kendall MD  3/28/2024  9:55 AM

## 2024-03-28 NOTE — DISCHARGE INSTRUCTIONS
Home Care Instructions for Colonoscopy and/or Gastroscopy with Sedation    Diet:  - Resume your regular diet as tolerated.  - Start with light meals to minimize bloating.  - Do not drink alcohol today.    Medication:  - If you have questions about resuming your normal medications, please contact your Primary Care Physician.    Activities:  - Take it easy today. Do not return to work today.  - Do not drive today.  - Do not operate any machinery today (including kitchen equipment).    Colonoscopy:  - You may notice some rectal \"spotting\" (a little blood on the toilet tissue) for a day or two after the exam. This is normal.  - If you experience any rectal bleeding (not spotting), persistent tenderness or sharp severe abdominal pains, oral temperature over 100 degrees Fahrenheit, light-headedness or dizziness, or any other problems, contact your doctor.    **If unable to reach your doctor, please go to the [facility] Emergency Room**    - Your referring physician will receive a full report of your examination.  - If you do not hear from your doctor's office within two weeks of your biopsy, please call them for your results.

## 2024-03-28 NOTE — ANESTHESIA PREPROCEDURE EVALUATION
PRE-OP EVALUATION    Patient Name: Emily Sood    Admit Diagnosis: Encounter for screening colonoscopy [Z12.11]    Pre-op Diagnosis: Encounter for screening colonoscopy [Z12.11]    COLONOSCOPY    Anesthesia Procedure: COLONOSCOPY    Surgeon(s) and Role:     * Elissa Kendall MD - Primary    Pre-op vitals reviewed.  Temp: 97.7 °F (36.5 °C)  Pulse: 80  Resp: 18  BP: 130/80  SpO2: 96 %  Body mass index is 32.84 kg/m².    Current medications reviewed.  Hospital Medications:  • lactated ringers infusion   Intravenous Continuous       Outpatient Medications:     Medications Prior to Admission   Medication Sig Dispense Refill Last Dose   • fluticasone propionate 50 MCG/ACT Nasal Suspension 2 sprays by Each Nare route daily.   3/27/2024   • cetirizine 10 MG Oral Tab Take 1 tablet (10 mg total) by mouth at bedtime.   3/27/2024   • difluprednate 0.05 % Ophthalmic Emulsion Apply to eye. Not started   3/27/2024   • olopatadine 0.1 % Ophthalmic Solution Place 1 drop into both eyes 2 (two) times daily. 5 mL 1    • fluticasone propionate 110 MCG/ACT Inhalation Aerosol Inhale 2 puffs into the lungs 2 (two) times daily. 3 each 3 3/27/2024   • PEG 3350-KCl-Na Bicarb-NaCl (TRILYTE) 420 g Oral Recon Soln Starting at 4:00 pm the night before procedure, drink 8 ounces of the prep every 15-20 minutes until finished 1 each 0 3/27/2024   • albuterol 108 (90 Base) MCG/ACT Inhalation Aero Soln Inhale 1-2 puffs into the lungs every 4 (four) hours as needed for Wheezing. 1 each 3 Past Week   • traZODone 50 MG Oral Tab Take 1 tablet (50 mg total) by mouth nightly as needed. AT BEDTIME   Past Week   • escitalopram (LEXAPRO) 20 MG Oral Tab Take 1 tablet (20 mg total) by mouth daily. 90 tablet 1 3/27/2024   • Cholecalciferol (VITAMIN D-3) 5000 units Oral Tab Take 1 tablet (5,000 Units total) by mouth daily.   Past Month   • CALCIUM-MAGNESIUM OR Take 1 tablet by mouth daily.   Past Month   • Multiple Vitamin (MULTI-VITAMIN) Oral Tab Take 1 tablet  by mouth daily.   Past Month   • EPINEPHrine (EPIPEN 2-WILLIAM IJ) Inject as directed as needed.   Unknown       Allergies: Eggs or egg-derived products, Latex, Tree nuts, Codeine [opioid analgesics], Ascorbate, Dust mite extract, Shellfish-derived products, Sulfa antibiotics, and Wheat bran      Anesthesia Evaluation    Patient summary reviewed.    Anesthetic Complications  (+) history of anesthetic complications         GI/Hepatic/Renal                (+) liver disease                 Cardiovascular                                                       Endo/Other                                  Pulmonary      (+) asthma                     Neuro/Psych      (+) depression                              Past Surgical History:   Procedure Laterality Date   • BENIGN BIOPSY RIGHT  2000   • CHOLECYSTECTOMY     • CYST ASPIRATION LEFT     • AUTUMN LOCALIZATION WIRE 1 SITE RIGHT (CPT=19281)  1996    benign   • OTHER SURGICAL HISTORY      Breast Lump removal. Right Breast. 2002   • OTHER SURGICAL HISTORY Left     srini in ear, s/p stapendectomy   • TONSILLECTOMY      1993     Social History     Socioeconomic History   • Marital status: Single   Tobacco Use   • Smoking status: Never   • Smokeless tobacco: Never   Vaping Use   • Vaping Use: Never used   Substance and Sexual Activity   • Alcohol use: Not Currently     Comment: socially   • Drug use: No   • Sexual activity: Yes     Partners: Male     Birth control/protection: Vasectomy   Other Topics Concern   • Caffeine Concern No   • Exercise No   • Seat Belt No   • Special Diet No   • Stress Concern No   • Weight Concern No     History   Drug Use No     Available pre-op labs reviewed.               Airway      Mallampati: II  Mouth opening: 3 FB  TM distance: > 6 cm  Neck ROM: full Cardiovascular    Cardiovascular exam normal.  Rhythm: regular  Rate: normal     Dental             Pulmonary    Pulmonary exam normal.                 Other findings          ASA: 2   Plan: MAC  NPO status  verified and patient meets guidelines.          Plan/risks discussed with: patient and significant other            Present on Admission:  **None**

## 2024-03-28 NOTE — ANESTHESIA POSTPROCEDURE EVALUATION
[facility]    Emily Sood Patient Status:  Hospital Outpatient Surgery   Age/Gender 48 year old female MRN [id number]   Location [facility] Attending Elissa Kendall MD   Hosp Day # 0 PCP Shad De MD       Anesthesia Post-op Note    COLONOSCOPY with forcep polypectomy    Procedure Summary       Date: 03/28/24 Room / Location:  ENDOSCOPY 04 / [facility]    Anesthesia Start: 0921 Anesthesia Stop: 0958    Procedure: COLONOSCOPY with forcep polypectomy Diagnosis:       Encounter for screening colonoscopy      (colon polyps)    Surgeons: Elissa Kendall MD Anesthesiologist: Francisco La MD    Anesthesia Type: MAC ASA Status: 2            Anesthesia Type: MAC    Vitals Value Taken Time   /70 03/28/24 0958   Temp 97.2 °F (36.2 °C) 03/28/24 0958   Pulse 93 03/28/24 0958   Resp 16 03/28/24 0958   SpO2 100 % 03/28/24 0958       Patient Location: Endoscopy    Anesthesia Type: MAC    Airway Patency: patent    Postop Pain Control: adequate    Mental Status: mildly sedated but able to meaningfully participate in the post-anesthesia evaluation    Nausea/Vomiting: none    Cardiopulmonary/Hydration status: stable euvolemic    Complications: no apparent anesthesia related complications    Postop vital signs: stable    Dental Exam: Unchanged from Preop    Patient to be discharged home when criteria met.

## 2024-04-03 ENCOUNTER — TELEPHONE (OUTPATIENT)
Facility: LOCATION | Age: 49
End: 2024-04-03

## 2024-04-03 NOTE — TELEPHONE ENCOUNTER
----- Message from Elissa Kendall MD sent at 4/2/2024 10:03 PM CDT -----  Ok to call patient with result. pt will need a colonoscopy in 7  years. Please place in the recall system.   ---------------  7-year colonoscopy recall placed, and Care Gaps updated

## 2024-11-14 ENCOUNTER — OFFICE VISIT (OUTPATIENT)
Dept: FAMILY MEDICINE CLINIC | Facility: CLINIC | Age: 49
End: 2024-11-14
Payer: MEDICAID

## 2024-11-14 VITALS
SYSTOLIC BLOOD PRESSURE: 118 MMHG | RESPIRATION RATE: 24 BRPM | DIASTOLIC BLOOD PRESSURE: 64 MMHG | HEART RATE: 82 BPM | WEIGHT: 207 LBS | HEIGHT: 68 IN | BODY MASS INDEX: 31.37 KG/M2 | OXYGEN SATURATION: 98 %

## 2024-11-14 DIAGNOSIS — J45.40 MODERATE PERSISTENT ASTHMA WITHOUT COMPLICATION (HCC): ICD-10-CM

## 2024-11-14 DIAGNOSIS — Z00.00 ENCOUNTER FOR ANNUAL PHYSICAL EXAM: Primary | ICD-10-CM

## 2024-11-14 DIAGNOSIS — J30.9 ALLERGIC RHINITIS, UNSPECIFIED SEASONALITY, UNSPECIFIED TRIGGER: ICD-10-CM

## 2024-11-14 DIAGNOSIS — J45.30 MILD PERSISTENT ASTHMA, UNSPECIFIED WHETHER COMPLICATED (HCC): ICD-10-CM

## 2024-11-14 DIAGNOSIS — Z12.31 SCREENING MAMMOGRAM FOR BREAST CANCER: ICD-10-CM

## 2024-11-14 PROCEDURE — 99396 PREV VISIT EST AGE 40-64: CPT | Performed by: EMERGENCY MEDICINE

## 2024-11-14 RX ORDER — FARICIMAB 6 MG/.05ML
0.05 INJECTION, SOLUTION INTRAVITREAL
COMMUNITY
Start: 2024-03-05

## 2024-11-14 RX ORDER — FLUTICASONE PROPIONATE 50 MCG
2 SPRAY, SUSPENSION (ML) NASAL DAILY
Qty: 1 EACH | Refills: 11 | Status: SHIPPED | OUTPATIENT
Start: 2024-11-14

## 2024-11-14 RX ORDER — ALBUTEROL SULFATE 90 UG/1
1-2 INHALANT RESPIRATORY (INHALATION) EVERY 4 HOURS PRN
Qty: 1 EACH | Refills: 3 | Status: SHIPPED | OUTPATIENT
Start: 2024-11-14

## 2024-11-14 RX ORDER — HYDROXYZINE HYDROCHLORIDE 10 MG/1
10 TABLET, FILM COATED ORAL DAILY
COMMUNITY
Start: 2024-10-04

## 2024-11-14 RX ORDER — FLUTICASONE PROPIONATE 110 UG/1
2 AEROSOL, METERED RESPIRATORY (INHALATION) 2 TIMES DAILY
Qty: 3 EACH | Refills: 3 | Status: SHIPPED | OUTPATIENT
Start: 2024-11-14

## 2024-11-14 NOTE — PATIENT INSTRUCTIONS
Thank you for choosing [facility]  To Do:  FOR SHAWNA WILKINSON    Have blood tests done  Arrange for mammogram  COntinue with flovent and albuterol as needed  Arrange for repeat mammogram left breast  Screening mammogram due in Dec  Follow up yearly or as needed  Recommend flu shingles pneumonia and tetanus shot.            Prevention Guidelines, Women Ages 50 to 64  Screening tests and vaccines are an important part of managing your health. Health counseling is essential, too. Below are guidelines for these, for women ages 50 to 64. Talk with your healthcare provider to make sure you’re up to date on what you need.  Screening Who needs it How often   Type 2 diabetes or prediabetes All adults beginning at age 45 and adults without symptoms at any age who are overweight or obese and have 1 or more additional risk factors for diabetes. At  least every 3 years   Alcohol misuse All women in this age group At routine exams   Blood pressure All women in this age group Every 2 years if your blood pressure is less than 120/80 mm Hg; yearly if your systolic blood pressure is 120 to 139 mm Hg, or your diastolic blood pressure reading is 80 to 89 mm Hg   Breast cancer All women in this age group Yearly mammogram and clinical breast exam1   Cervical cancer All women in this age group, except women who have had a complete hysterectomy Pap test every 3 years or Pap test with human papillomavirus (HPV) test every 5 years   Chlamydia Women at increased risk for infection At routine exams   Colorectal cancer All women in this age group Flexible sigmoidoscopy every 5 years, or colonoscopy every 10 years, or double-contrast barium enema every 5 years; yearly fecal occult blood test or fecal immunochemical test; or a stool DNA test as often as your health care provider advises; talk with your health care provider about which tests are best for you   Depression All women in this age group At routine exams   Gonorrhea Sexually  active women at increased risk for infection At routine exams   Hepatitis C Anyone at increased risk; 1 time for those born between 1945 and 1965 At routine exams   High cholesterol or triglycerides All women in this age group who are at risk for coronary artery disease At least every 5 years   HIV All women At routine exams   Lung cancer Adults age 55 to 80 who have smoked Yearly screening in smokers with 30 pack-year history of smoking or who quit within 15 years   Obesity All women in this age group At routine exams   Osteoporosis Women who are postmenopausal Ask your healthcare provider   Syphilis Women at increased risk for infection - talk with your healthcare provider At routine exams   Tuberculosis Women at increased risk for infection - talk with your healthcare provider Ask your healthcare provider   Vision All women in this age group Ask your healthcare provider   Vaccine Who needs it How often   Chickenpox (varicella) All women in this age group who have no record of this infection or vaccine 2 doses; the second dose should be given at least 4 weeks after the first dose   Hepatitis A Women at increased risk for infection - talk with your healthcare provider 2 doses given at least 6 months apart   Hepatitis B Women at increased risk for infection - talk with your healthcare provider 3 doses over 6 months; second dose should be given 1 month after the first dose; the third dose should be given at least 2 months after the second dose and at least 4 months after the first dose   Haemophilus influenzaeType B (HIB) Women at increased risk for infection - talk with your healthcare provider 1 to 3 doses   Influenza (flu) All women in this age group Once a year   Measles, mumps, rubella (MMR) Women in this age group through their late 50s who have no record of these infections or vaccines 1 dose   Meningococcal Women at increased risk for infection - talk with your healthcare provider 1 or more doses    Pneumococcal conjugate vaccine (PCV13) and pneumococcal polysaccharide vaccine (PPSV23) Women at increased risk for infection - talk with your healthcare provider PCV13: 1 dose ages 19 to 65 (protects against 13 types of pneumococcal bacteria)  PPSV23: 1 to 2 doses through age 64, or 1 dose at 65 or older (protects against 23 types of pneumococcal bacteria)   Tetanus/diphtheria/pertussis (Td/Tdap) booster All women in this age group Td every 10 years, or a one-time dose of Tdap instead of a Td booster after age 18, then Td every 10 years   Zoster All women ages 60 and older 1 dose   Counseling Who needs it How often   BRCA gene mutation testing for breast and ovarian cancer susceptibility Women with increased risk for having gene mutation When your risk is known   Breast cancer and chemoprevention Women at high risk for breast cancer When your risk is known   Diet and exercise Women who are overweight or obese When diagnosed, and then at routine exams   Sexually transmitted infection prevention Women at increased risk for infection - talk with your healthcare provider At routine exams   Use of daily aspirin Women ages 55 and up in this age group who are at risk for cardiovascular health problems such as stroke When your risk is known   Use of tobacco and the health effects it can cause All women in this age group Every exam   1[employer]  Date Last Reviewed: 1/26/2016  © 1073-3834 [employer]. [address] [place] [address], [place] [address]. All rights reserved. This information is not intended as a substitute for professional medical care. Always follow your healthcare professional's instructions.        Preventing Osteoporosis: Meeting Your Calcium Needs    Your body needs calcium to build and repair bones. But it can't make calcium on its own. That's why it's important to eat calcium-rich foods. Some foods are naturally rich in calcium. Others have calcium added (fortified). It's best to  get calcium from the foods you eat. But if you can't get enough, you may want to take calcium supplements. To meet your daily calcium needs, try the foods listed below.  Dairy Fish & beans Other sources      Source   Calcium (mg) per serving   Source   Calcium (mg) per serving   Source   Calcium (mg) per serving      Low-fat yogurt, plain   415 mg/8 oz.   Sardines, [place], canned, with bones   351 mg/3 oz.   Oatmeal, instant, fortified   215 mg/1 cup   Nonfat milk   302 mg/1 cup   [place], sockeye, canned, with bones   239 mg/3 oz.   Tofu made with calcium sulfate   204 mg/3 oz.   Low-fat milk   297 mg/1 cup   Soybeans, fresh, boiled   131 mg/1/2 cup   Collards   179 mg/1/2 cup   Swiss cheese   272 mg/1 oz.   White beans, cooked   81 mg/1/2 cup   English muffin, whole wheat   175 mg/1 muffin   Cheddar cheese   205 mg/1 oz.   Navy beans, cooked   79 mg/1/2 cup   Kale   90 mg/1/2 cup   Ice cream strawberry   79 mg/1/2 cup           Orange, navel   56 mg/1 medium   Note: Calcium levels may vary depending on brand and size.  Daily calcium needs  14-18 years old: 1,300 mg  19-30 years old: 1,000 mg  31-50 years old: 1,000 mg  51-70 years old, women: 1,200 mg  51-70 years old, men: 1,000 mg  Pregnant or nursin-28 years old: 1,300 mg, 19-50 years old: 1,000 mg  Older than 70 (women and men): 1,200 mg   Date Last Reviewed: 10/17/2015  © 3225-1570 [employer]. [address] [place] [address], [place] [address]. All rights reserved. This information is not intended as a substitute for professional medical care. Always follow your healthcare professional's instructions.

## 2024-11-14 NOTE — PROGRESS NOTES
Emily Sood is a 49 year old female who presents for a complete physical exam.   HPI:     Chief Complaint   Patient presents with    Well Adult          Age: 49    1First day of last menstrual period (or first year of         menstruation, if through menopause Oct 20   2Number of times pregnant: 0              Number of completed pregnancies:  0              Date of last pregnancy:      3. If you are under age 55, what method of birth control do you use? NO              Are you planning a pregnancy  in the next 6-12 months? NO               If you are through menopause or over age 50, do you take  any of the following pills?                          Calcium YES                        Estrogen (Premarin)              NO                        Progesterone (Provera) NO   4. Have you had any of the following problems:               A.  Abnormal Pap smears  All NL               If yes, date:                 problem:                 For abnormality, did you have any of the following done:                    Colposcopy  NO                  Biopsies    NO                  Surgery     NO            B. High blood pressure, heart disease or high cholesterol NO                  D.Abdominal or pelvic surgery or special tests Cholecystectomy   5. Do you have any of the following:      Problems with present method of birth control NA   Bleeding between periods or since periods stopped NO    A new or enlarging lump in breast NO      Change in size/firmness of stools  YES   NO   Change in size/color of a mole NO   6. Do you have a parent, brother or sister with a history of the following:                  Cancer of the breast, intestine or female organs YES Grandmother Dx in her 70's                Heart pain or heart attacks  before the age of 55 YES brother with MI, 46 y/o   8. Have you ever used tobacco?      NO   9. Do you drink alcohol?              Rare/minimal   10. Prevention:         b. Exercise:                               Activity:  tries       c. Do you always wear seat belts?          YES       d. If over 30 years old, have you  had your cholesterol level checked  in the past five years? YES       e. Have you had a tetanus shot  the past 10 years? No Declines today          f. Does your house have a working smoke detector? YES        g. Do you have firearms at home?           NO        h. Have you ever had a mammogram?  YES       i.  How many sexual partners have you  had in the last 12 months? 1            In your lifetime?       j. When is the last time you had           a dental check-up?  Few months ago         11. Please describe any concerns you have:      ASTHMA  On Albuterol and Flovent  Rare use of albuterol  No night Sx  Non smoker  Sx usually triggered by change in season           Wt Readings from Last 3 Encounters:   11/14/24 207 lb (93.9 kg)   03/28/24 216 lb (98 kg)   02/09/24 217 lb (98.4 kg)        BP Readings from Last 3 Encounters:   11/14/24 118/64   03/28/24 117/73   02/09/24 134/80         Patient's last menstrual period was 10/20/2024 (approximate).       Current Outpatient Medications   Medication Sig Dispense Refill    Faricimab-svoa (VABYSMO) 6 MG/0.05ML Intravitreal Solution 0.05 mL by Intravitreal route.      hydrOXYzine 10 MG Oral Tab Take 1 tablet (10 mg total) by mouth daily.      fluticasone propionate 50 MCG/ACT Nasal Suspension 2 sprays by Each Nare route daily. 1 each 11    fluticasone propionate 110 MCG/ACT Inhalation Aerosol Inhale 2 puffs into the lungs 2 (two) times daily. 3 each 3    albuterol 108 (90 Base) MCG/ACT Inhalation Aero Soln Inhale 1-2 puffs into the lungs every 4 (four) hours as needed for Wheezing. 1 each 3    cetirizine 10 MG Oral Tab Take 1 tablet (10 mg total) by mouth at bedtime.      escitalopram (LEXAPRO) 20 MG Oral Tab Take 1 tablet (20 mg total) by mouth daily. 90 tablet 1    Cholecalciferol (VITAMIN D-3) 5000 units Oral Tab Take 1 tablet (5,000 Units total) by mouth  daily.      CALCIUM-MAGNESIUM OR Take 1 tablet by mouth daily.      Multiple Vitamin (MULTI-VITAMIN) Oral Tab Take 1 tablet by mouth daily.      EPINEPHrine (EPIPEN 2-WILLIAM IJ) Inject as directed as needed.        Past Medical History:    Allergic rhinitis    Anxiety    Arthritis    Costochondritis    ASTHMA    Asthma (HCC)    and allergies    Depression    Disorder of liver    fatty liver    Extrinsic asthma, unspecified    Fibrocystic breast changes    lumps removed    H/O breast biopsy    HEADACHES    stress or allergy related    PONV (postoperative nausea and vomiting)    SEASONAL ALLERGIES    Visual impairment    glasses/contact lenses      Past Surgical History:   Procedure Laterality Date    Benign biopsy right  2000    Cholecystectomy      Colonoscopy N/A 3/28/2024    Procedure: COLONOSCOPY with forcep polypectomy;  Surgeon: Elissa Kendall MD;  Location:  ENDOSCOPY    Cyst aspiration left      Anisa localization wire 1 site right (cpt=19281)  1996    benign    Other surgical history      Breast Lump removal. Right Breast. 2002    Other surgical history Left     srini in ear, s/p stapendectomy    Tonsillectomy      1993      Family History   Problem Relation Age of Onset    Hypertension Maternal Grandmother     Thyroid Disorder Maternal Grandmother     High Cholesterol Maternal Grandmother     Breast Cancer Maternal Grandmother 75        75    Cancer Maternal Grandmother         Skin    Hypertension Maternal Grandfather     High Cholesterol Maternal Grandfather     Hypertension Paternal Grandmother     High Cholesterol Paternal Grandmother     Heart Disease Paternal Grandmother     Hypertension Paternal Grandfather     High Cholesterol Paternal Grandfather     Heart Disease Paternal Grandfather     Thyroid Disorder Other     Hypertension Father     Hypertension Mother     Cancer Mother         Skin    Heart Disease Maternal Grandfather     Heart Disease Maternal Grandfather       Social History      Socioeconomic History    Marital status: Single   Tobacco Use    Smoking status: Never    Smokeless tobacco: Never   Vaping Use    Vaping status: Never Used   Substance and Sexual Activity    Alcohol use: Not Currently     Comment: socially    Drug use: No    Sexual activity: Yes     Partners: Male     Birth control/protection: Vasectomy   Other Topics Concern    Caffeine Concern No    Exercise No    Seat Belt No    Special Diet No    Stress Concern No    Weight Concern No   Social History Narrative    ** Merged History Encounter **             Current Outpatient Medications   Medication Sig Dispense Refill    Faricimab-svoa (VABYSMO) 6 MG/0.05ML Intravitreal Solution 0.05 mL by Intravitreal route.      hydrOXYzine 10 MG Oral Tab Take 1 tablet (10 mg total) by mouth daily.      fluticasone propionate 50 MCG/ACT Nasal Suspension 2 sprays by Each Nare route daily. 1 each 11    fluticasone propionate 110 MCG/ACT Inhalation Aerosol Inhale 2 puffs into the lungs 2 (two) times daily. 3 each 3    albuterol 108 (90 Base) MCG/ACT Inhalation Aero Soln Inhale 1-2 puffs into the lungs every 4 (four) hours as needed for Wheezing. 1 each 3    cetirizine 10 MG Oral Tab Take 1 tablet (10 mg total) by mouth at bedtime.      escitalopram (LEXAPRO) 20 MG Oral Tab Take 1 tablet (20 mg total) by mouth daily. 90 tablet 1    Cholecalciferol (VITAMIN D-3) 5000 units Oral Tab Take 1 tablet (5,000 Units total) by mouth daily.      CALCIUM-MAGNESIUM OR Take 1 tablet by mouth daily.      Multiple Vitamin (MULTI-VITAMIN) Oral Tab Take 1 tablet by mouth daily.      EPINEPHrine (EPIPEN 2-WILLIAM IJ) Inject as directed as needed.        Past Medical History:    Allergic rhinitis    Anxiety    Arthritis    Costochondritis    ASTHMA    Asthma (HCC)    and allergies    Depression    Disorder of liver    fatty liver    Extrinsic asthma, unspecified    Fibrocystic breast changes    lumps removed    H/O breast biopsy    HEADACHES    stress or allergy  related    PONV (postoperative nausea and vomiting)    SEASONAL ALLERGIES    Visual impairment    glasses/contact lenses      Past Surgical History:   Procedure Laterality Date    Benign biopsy right  2000    Cholecystectomy      Colonoscopy N/A 3/28/2024    Procedure: COLONOSCOPY with forcep polypectomy;  Surgeon: Elissa Kendall MD;  Location: [facility] ENDOSCOPY    Cyst aspiration left      Anisa localization wire 1 site right (cpt=19281)  1996    benign    Other surgical history      Breast Lump removal. Right Breast. 2002    Other surgical history Left     srini in ear, s/p stapendectomy    Tonsillectomy      1993      Family History   Problem Relation Age of Onset    Hypertension Maternal Grandmother     Thyroid Disorder Maternal Grandmother     High Cholesterol Maternal Grandmother     Breast Cancer Maternal Grandmother 75        75    Cancer Maternal Grandmother         Skin    Hypertension Maternal Grandfather     High Cholesterol Maternal Grandfather     Hypertension Paternal Grandmother     High Cholesterol Paternal Grandmother     Heart Disease Paternal Grandmother     Hypertension Paternal Grandfather     High Cholesterol Paternal Grandfather     Heart Disease Paternal Grandfather     Thyroid Disorder Other     Hypertension Father     Hypertension Mother     Cancer Mother         Skin    Heart Disease Maternal Grandfather     Heart Disease Maternal Grandfather       Social History:   Social History     Socioeconomic History    Marital status: Single   Tobacco Use    Smoking status: Never    Smokeless tobacco: Never   Vaping Use    Vaping status: Never Used   Substance and Sexual Activity    Alcohol use: Not Currently     Comment: socially    Drug use: No    Sexual activity: Yes     Partners: Male     Birth control/protection: Vasectomy   Other Topics Concern    Caffeine Concern No    Exercise No    Seat Belt No    Special Diet No    Stress Concern No    Weight Concern No   Social History Narrative    ** Merged  History Encounter **                 REVIEW OF SYSTEMS:   GENERAL HEALTH: feels well, no fatigue.  SKIN: denies any unusual skin lesions or rashes  EYES: no visual complaints or deficits  HEENT: denies nasal congestion, sinus pain or sore throat; hearing loss negative,   RESPIRATORY: denies shortness of breath, wheezing or cough   CARDIOVASCULAR: denies chest pain, SOB, edema,orthopnea, no palpitations   GI: denies nausea, vomiting, constipation, diarrhea; no rectal bleeding; no heartburn  GENITAL/: no dysuria, urgency or frequency  MUSCULOSKELETAL: no joint complaints upper or lower extremities  NEURO: no sensory or motor complaint  HEMATOLOGY: denies hx anemia; denies bruising or excessive bleeding  ENDOCRINE: denies excessive thirst or urination; denies unexpected wt gain or wt loss  ALLERGY/IMM.: denies food or seasonal allergies  PSYCH: no symptoms of depression or anxiety, depression screening negative.        EXAM:   /64   Pulse 82   Resp 24   Ht 5' 8\" (1.727 m)   Wt 207 lb (93.9 kg)   LMP 10/20/2024 (Approximate)   SpO2 98%   BMI 31.47 kg/m²       General: WD/WN in no acute distress.   HEENT: PERRLA and EOMI.  OP moist no lesions.TM WNL, maura.Normal ears canals bilaterally.  Neck is supple, with no cervical LAD or thyroid abnormalities. No carotid bruits.    Lungs: are clear to auscultation bilaterally, with no wheeze, rhonchi, or rales.   Heart: is RRR.  S1, S2, with no murmurs,clicks, gallops  Abdomen: is soft,NBS, NT/ND with no HSM.  No rebound or guarding. No CVA tenderness, no hernias.   exam: DEFERRED  Neuro: Cranial nerves II-XII normal,no focal abnormalities, and reflexes coordination and gait normal and symmetric.Sensation intact.  Extremities: are symmetric with no cyanosis, clubbing, or edema.  MS: Normal muscles tones, no joints abnormalities.  SKIN: Normal color, turgor, no lesions, rashes or wounds.  PSYCH: normal affect and mood.        ASSESSMENT AND PLAN:       1. Encounter  for annual physical exam  - CBC With Differential With Platelet; Future  - Comp Metabolic Panel (14); Future  - Lipid Panel; Future  - TSH W Reflex To Free T4; Future    2. Screening mammogram for breast cancer  - [facility] FRANCISCO JAVIER 2D+3D SCREENING BILAT (CPT=77067/03759); Future    3. Moderate persistent asthma without complication (HCC)  - albuterol 108 (90 Base) MCG/ACT Inhalation Aero Soln; Inhale 1-2 puffs into the lungs every 4 (four) hours as needed for Wheezing.  Dispense: 1 each; Refill: 3    4. Allergic rhinitis, unspecified seasonality, unspecified trigger  - albuterol 108 (90 Base) MCG/ACT Inhalation Aero Soln; Inhale 1-2 puffs into the lungs every 4 (four) hours as needed for Wheezing.  Dispense: 1 each; Refill: 3    5. Mild persistent asthma, unspecified whether complicated (HCC)  - fluticasone propionate 110 MCG/ACT Inhalation Aerosol; Inhale 2 puffs into the lungs 2 (two) times daily.  Dispense: 3 each; Refill: 3          Emily Sood is a 49 year old female who presents for a complete physical exam.Gyn exam and Pap smear UTD.  Self breast exams advised.  The patient should schedule annual mammograms beginning at the age of 40.    Counseled on fat diet and aerobic exercise 30 minutes three times weekly.   Counseled on maintaining a healthy weight and healthy BMI  Health maintenance.   Immunizations reviewed and updated  TDAP Due, pt refuses  Refuses PCV 20 shingles and DTAP    The patient indicates understanding of these issues and agrees to the plan.  The patient is asked  to return yearly for annual preventative health exam.    Well balanced diet recommended.    Routine exercise recommended most days during the week.  Wear sunscreen - SPF 15 or higher and reapply every 2 hours as needed.  Wear seat belts and drive safely.  Schedule regular appointments with dentist.  Schedule yearly eye exam if you wear glasses/contacts.  Yearly Flu Vaccine recommended.  Tetanus, Diptheria and Pertussis vaccine should be  given every 7-10 years.  Call or come in if there are concerns regarding domestic abuse, sexually transmitted diseases, alcohol/drug addiction, depression/anxiety issues, or any further concerns.    PATIENT INSTRUCTIONS:    Have blood tests done  Arrange for mammogram  COntinue with flovent and albuterol as needed  Arrange for repeat mammogram left breast  Screening mammogram due in Dec  Follow up yearly or as needed  Recommend flu shingles pneumonia and tetanus shot.      FOLLOW UP:  Yearly or as neeed      Health Maintenance Due   Topic Date Due    Asthma Control Test  Never done    Pneumococcal Vaccine: Birth to 64yrs (1 of 2 - PCV) Never done    DTaP,Tdap,and Td Vaccines (1 - Tdap) Never done    Zoster Vaccines (1 of 2) Never done    Annual Depression Screening  01/01/2024    COVID-19 Vaccine (3 - 2024-25 season) 09/01/2024    Influenza Vaccine (1) Never done    Annual Physical  11/13/2024    Mammogram  01/16/2025

## 2025-01-11 ENCOUNTER — HOSPITAL ENCOUNTER (OUTPATIENT)
Dept: MAMMOGRAPHY | Age: 50
Discharge: HOME | End: 2025-01-11
Attending: EMERGENCY MEDICINE
Payer: MEDICAID

## 2025-01-11 DIAGNOSIS — Z12.31 SCREENING MAMMOGRAM FOR BREAST CANCER: ICD-10-CM

## 2025-01-11 PROCEDURE — 77063 BREAST TOMOSYNTHESIS BI: CPT | Performed by: EMERGENCY MEDICINE

## 2025-01-11 PROCEDURE — 77067 SCR MAMMO BI INCL CAD: CPT | Performed by: EMERGENCY MEDICINE

## 2025-02-03 ENCOUNTER — HOSPITAL ENCOUNTER (OUTPATIENT)
Dept: MAMMOGRAPHY | Age: 50
Discharge: HOME | End: 2025-02-03
Attending: EMERGENCY MEDICINE
Payer: MEDICAID

## 2025-02-03 ENCOUNTER — HOSPITAL ENCOUNTER (OUTPATIENT)
Dept: ULTRASOUND IMAGING | Age: 50
Discharge: HOME | End: 2025-02-03
Attending: EMERGENCY MEDICINE
Payer: MEDICAID

## 2025-02-03 DIAGNOSIS — R92.2 INCONCLUSIVE MAMMOGRAM: ICD-10-CM

## 2025-02-03 PROCEDURE — 77061 BREAST TOMOSYNTHESIS UNI: CPT | Performed by: EMERGENCY MEDICINE

## 2025-02-03 PROCEDURE — 77065 DX MAMMO INCL CAD UNI: CPT | Performed by: EMERGENCY MEDICINE

## 2025-02-03 PROCEDURE — 76642 ULTRASOUND BREAST LIMITED: CPT | Performed by: EMERGENCY MEDICINE

## 2025-05-22 ENCOUNTER — OFFICE VISIT (OUTPATIENT)
Dept: FAMILY MEDICINE CLINIC | Facility: CLINIC | Age: 50
End: 2025-05-22
Payer: MEDICAID

## 2025-05-22 VITALS
OXYGEN SATURATION: 97 % | BODY MASS INDEX: 32.13 KG/M2 | WEIGHT: 212 LBS | HEIGHT: 68 IN | SYSTOLIC BLOOD PRESSURE: 110 MMHG | RESPIRATION RATE: 16 BRPM | DIASTOLIC BLOOD PRESSURE: 62 MMHG | HEART RATE: 94 BPM

## 2025-05-22 DIAGNOSIS — N30.00 ACUTE CYSTITIS WITHOUT HEMATURIA: Primary | ICD-10-CM

## 2025-05-22 LAB
APPEARANCE: CLEAR
BILIRUBIN: NEGATIVE
GLUCOSE (URINE DIPSTICK): NEGATIVE MG/DL
KETONES (URINE DIPSTICK): NEGATIVE MG/DL
MULTISTIX LOT#: ABNORMAL NUMERIC
NITRITE, URINE: POSITIVE
PH, URINE: 6 (ref 4.5–8)
PROTEIN (URINE DIPSTICK): NEGATIVE MG/DL
SPECIFIC GRAVITY: 1.01 (ref 1–1.03)
URINE-COLOR: YELLOW
UROBILINOGEN,SEMI-QN: 0.2 MG/DL (ref 0–1.9)

## 2025-05-22 PROCEDURE — 87186 SC STD MICRODIL/AGAR DIL: CPT | Performed by: EMERGENCY MEDICINE

## 2025-05-22 PROCEDURE — 87088 URINE BACTERIA CULTURE: CPT | Performed by: EMERGENCY MEDICINE

## 2025-05-22 PROCEDURE — 87086 URINE CULTURE/COLONY COUNT: CPT | Performed by: EMERGENCY MEDICINE

## 2025-05-22 RX ORDER — CIPROFLOXACIN 500 MG/1
500 TABLET, FILM COATED ORAL 2 TIMES DAILY
Qty: 14 TABLET | Refills: 0 | Status: SHIPPED | OUTPATIENT
Start: 2025-05-22 | End: 2025-05-29

## 2025-05-22 NOTE — PATIENT INSTRUCTIONS
Thank you for choosing [facility]  To Do:  FOR SHAWNA WILKINSON    Start Antibiotics  Finish all antibiotics as prescribed to prevent resistance.   Drink at least 8 x 8 oz. cups of fluids per day.   Wipe from front to back when using toilet.   Urinate after intercourse.   Follow up in 2 weeks if not better  To Emergency Room for any worsening of condition, increased pain, blood in urine, difficulty urinating, abdominal or flank pain, high fever, nausea, vomiting or any concerning symptoms etc.

## 2025-05-22 NOTE — PROGRESS NOTES
Subjective:   Emily Sood is a 49 year old female who presents for UTI (Pt c/o frequency, urgency, suprapubic pain and chills x yesterday. Pt notes purchased OCT UTI test and positive for UTI. Pt took abx [place] pills )         History/Other:   History of Present Illness  Emily Sood is a 49 year old female who presents with symptoms suggestive of a urinary tract infection.    She experiences significant urinary frequency and urgency, urinating five times in a short period this morning with minimal output each time. Urination is painful, and the sensation of needing to urinate is urgent. The urine stream often stops abruptly, although she still feels like she is urinating.    No fever, but she reports chills and hot flashes yesterday, describing them as feeling flushed. No kidney pain is noted.    She is currently taking care of her grandparents, involving frequent use of antibacterial cleansing wipes and sanitizing products, potentially affecting her skin chemistry. She is also applying antifungal cream to her grandmother's diaper rash.    She has a known allergy to sulfa drugs, though she cannot recall the specific reaction. She has been treated with antibiotics for UTIs in the past.         Chief Complaint Reviewed and Verified  Nursing Notes Reviewed and   Verified  Tobacco Reviewed  Allergies Reviewed  Medications Reviewed    Medical History Reviewed  Surgical History Reviewed  OB Status Reviewed    Family History Reviewed  Social History Reviewed         Tobacco:  She has never smoked tobacco.    Current Medications[1]      Review of Systems:  Review of Systems      Objective:   /62   Pulse 94   Resp 16   Ht 5' 8\" (1.727 m)   Wt 212 lb (96.2 kg)   LMP 05/09/2025 (Exact Date)   SpO2 97%   BMI 32.23 kg/m²  Estimated body mass index is 32.23 kg/m² as calculated from the following:    Height as of this encounter: 5' 8\" (1.727 m).    Weight as of this encounter: 212 lb (96.2 kg).  Physical  Exam      GENERAL: well developed, well nourished, well hydrated, no distress  SKIN: good skin turgor, no obvious rashes  GI:soft Nontender Normoactive BS.  No palpable masses no guarding rigidity no rebound tenderness, no CVA tenderness bilaterally        Assessment & Plan:   1. Acute cystitis without hematuria (Primary)  -     URINALYSIS, AUTO, W/O SCOPE  -     Urine Culture, Routine; Future; Expected date: 05/22/2025  -     Urine Culture, Routine  -     Ciprofloxacin HCl; Take 1 tablet (500 mg total) by mouth 2 (two) times daily for 7 days.  Dispense: 14 tablet; Refill: 0    Assessment & Plan  Urinary tract infection    - Send urine for culture - Prescribe ciprofloxacin 500 mg orally twice daily for 7 days.  - Advise to complete the full course of antibiotics.  - Instruct on proper hygiene: wipe front to back and urinate post-coitus.  - Advise follow-up in 1-2 weeks if symptoms persist.  - Instruct to seek emergency care if symptoms worsen, such as nausea, vomiting, flank pain, or hematuria.      No follow-ups on file.      Shad De MD, 5/22/2025, 11:42 AM              [1]   Current Outpatient Medications   Medication Sig Dispense Refill    ciprofloxacin 500 MG Oral Tab Take 1 tablet (500 mg total) by mouth 2 (two) times daily for 7 days. 14 tablet 0    Faricimab-svoa (VABYSMO) 6 MG/0.05ML Intravitreal Solution 0.05 mL by Intravitreal route.      hydrOXYzine 10 MG Oral Tab Take 1 tablet (10 mg total) by mouth daily.      fluticasone propionate 50 MCG/ACT Nasal Suspension 2 sprays by Each Nare route daily. 1 each 11    fluticasone propionate 110 MCG/ACT Inhalation Aerosol Inhale 2 puffs into the lungs 2 (two) times daily. 3 each 3    albuterol 108 (90 Base) MCG/ACT Inhalation Aero Soln Inhale 1-2 puffs into the lungs every 4 (four) hours as needed for Wheezing. 1 each 3    cetirizine 10 MG Oral Tab Take 1 tablet (10 mg total) by mouth at bedtime.      escitalopram (LEXAPRO) 20 MG Oral Tab Take 1 tablet  (20 mg total) by mouth daily. 90 tablet 1    Cholecalciferol (VITAMIN D-3) 5000 units Oral Tab Take 1 tablet (5,000 Units total) by mouth daily.      CALCIUM-MAGNESIUM OR Take 1 tablet by mouth daily.      Multiple Vitamin (MULTI-VITAMIN) Oral Tab Take 1 tablet by mouth daily.      EPINEPHrine (EPIPEN 2-WILLIAM IJ) Inject as directed as needed.

## 2025-05-22 NOTE — PROGRESS NOTES
The following individual(s) verbally consented to be recorded using ambient AI listening technology and understand that they can each withdraw their consent to this listening technology at any point by asking the clinician to turn off or pause the recording:    Patient name: Emily Sood  Additional names:

## (undated) DEVICE — 10FT COMBINED O2 DELIVERY/CO2 MONITORING. FILTER WITH MICROSTREAM TYPE LUER: Brand: DUAL ADULT NASAL CANNULA

## (undated) DEVICE — KIT VLV 5 PC AIR H2O SUCT BX ENDOGATOR CONN

## (undated) DEVICE — GIJAW SINGLE-USE BIOPSY FORCEPS WITH NEEDLE: Brand: GIJAW

## (undated) DEVICE — TRAP POLYP W/ 2 SPEC TY CLR MAGNIFYING WIND

## (undated) DEVICE — 1200CC GUARDIAN II: Brand: GUARDIAN

## (undated) DEVICE — KIT CUSTOM ENDOPROCEDURE STERIS

## (undated) DEVICE — 3M™ RED DOT™ MONITORING ELECTRODE WITH FOAM TAPE AND STICKY GEL, 50/BAG, 20/CASE, 72/PLT 2570: Brand: RED DOT™

## (undated) NOTE — Clinical Note
Hi B! Praveen Helms is scheduled to have a damian in January but today she c/o blooy stools. She's also due for a screening colonoscopy. Is there anyway you can also do the colonoscopy as well prior to Sx? I told her to reach out to you or your nurse as well. Let me know!  Thanks in advance!!

## (undated) NOTE — LETTER
ASTHMA ACTION PLAN for Wendie Florence     : 1975     Date: 2019  Provider:  EVELIO Puckett  Phone for doctor or clinic: ED [facility], [address], [place] [address] [address]  [facility] [address] [phone]    ACT Score: 25

## (undated) NOTE — LETTER
ASTHMA ACTION PLAN for [facility]     : 1975     Date: 2022  Provider:  Erika Scott MD  Phone for doctor or clinic: [address] [facility], [address], [address]  [phone]    ACT Score: 22      You can use the colors of a traffic light to help learn about your asthma medicines. 1. Green - Go! % of Personal Best Peak Flow Use controller medicine. Breathing is good  No cough or wheeze  Can work and play Medicine How much to take When to take it          2. Yellow - Caution. 50-79% Personal Best Peak  Flow. Use reliever medicine to keep an asthma attack from getting bad. Cough  Wheezing  Tight Chest  Wake up at night Medicine How much to take When to take it    Albuterol Inhaler                   2 puffs                             Every 4 to 6 hrs as needed for                                                                                  shortness of breath and wheezing. Additional instructions Call our office in event of an increased use of rescue inhaler to make an appointment in order to prevent red zone symptoms. 3. Red - Stop! Danger!  <50% Personal Best Peak  Flow. Take these medications until  Get help from a doctor   Medicine not helping  Breathing is hard and fast  Nose opens wide  Can't walk  Ribs show  Can't talk well Medicine How much to take When to take it    Call 911, or go to the emergency room immediately! Take Albuterol every 15 minutes until you have received medical attention. Additional Instructions If your symptoms do not improve and you cannot contact your doctor, [facility] or call 911 immediately! [x] Asthma Action Plan reviewed with patient (and caregiver if necessary) and a copy of the plan was given to the patient/caregiver. [] Asthma Action Plan reviewed with patient (and caregiver if necessary) on the phone and mailed copy to patient or submitted via [address] Signatures:  Provider  Yamila Arevalo MD   Patient Caretaker

## (undated) NOTE — LETTER
23    Patient: Nick Egan  : 1975 Visit date: 2023    Dear  Aliza Ramirez MD    Thank you for referring Nick Egan to my practice. Please find my assessment and plan below. Assessment   1. Encounter for screening colonoscopy          Plan   The patient is 46year old and has not had a colonoscopy. The patient's family history is negative. The patient does have gastrointestinal symptoms mainly diarrhea. She reports this has accentuated in July after cholecystectomy. Recommend proceeding with colonoscopy. I also discussed with her strategies to decrease diarrhea like limiting high fat content food and increasing fiber in her diet. She is going to trial that. If she continues to have diarrhea then I plan to prescribe a 4 week course of cholestyramine to aid with symptoms. The benefits of colonoscopy, including detection of cancer and polyp removal prior to progression to cancer were discussed. The details of the procedure which include navigation of the colonoscope through the anus, rectum, and colon to evaluate the mucosa and removal of any polyps encountered were discussed as well. The risks of colonoscopy were discussed with the patient and include but are not limited to post-procedure bleeding, infection, colorectal perforation, splenic injury, missed polyps, need for additional surgeries or interventions. All questions were answered and the patient voiced understanding and agreed to proceed with colonoscopy.             Santiago Pineda MD       Sincerely,       Santiago Pineda MD   CC:   No Recipients

## (undated) NOTE — LETTER
Date: 1/23/2019    Patient Name: Joselito Allen          To Whom it may concern: The above patient was seen at the [facility] for treatment of a medical condition. This patient should be excused from attending work 1/23/1.  If you have